# Patient Record
Sex: FEMALE | Race: WHITE | NOT HISPANIC OR LATINO | Employment: FULL TIME
[De-identification: names, ages, dates, MRNs, and addresses within clinical notes are randomized per-mention and may not be internally consistent; named-entity substitution may affect disease eponyms.]

---

## 2017-04-26 ENCOUNTER — RECORDS - HEALTHEAST (OUTPATIENT)
Dept: ADMINISTRATIVE | Facility: OTHER | Age: 62
End: 2017-04-26

## 2017-05-25 ENCOUNTER — COMMUNICATION - HEALTHEAST (OUTPATIENT)
Dept: FAMILY MEDICINE | Facility: CLINIC | Age: 62
End: 2017-05-25

## 2017-05-25 DIAGNOSIS — K21.9 ESOPHAGEAL REFLUX: ICD-10-CM

## 2017-08-04 ENCOUNTER — OFFICE VISIT - HEALTHEAST (OUTPATIENT)
Dept: FAMILY MEDICINE | Facility: CLINIC | Age: 62
End: 2017-08-04

## 2017-08-04 DIAGNOSIS — K21.9 ESOPHAGEAL REFLUX: ICD-10-CM

## 2017-08-04 DIAGNOSIS — M79.671 PAIN OF RIGHT HEEL: ICD-10-CM

## 2017-08-04 DIAGNOSIS — K44.9 HIATAL HERNIA: ICD-10-CM

## 2017-09-20 ENCOUNTER — AMBULATORY - HEALTHEAST (OUTPATIENT)
Dept: ONCOLOGY | Facility: HOSPITAL | Age: 62
End: 2017-09-20

## 2017-09-20 DIAGNOSIS — C50.412 MALIGNANT NEOPLASM OF UPPER-OUTER QUADRANT OF LEFT FEMALE BREAST (H): ICD-10-CM

## 2017-10-06 ENCOUNTER — RECORDS - HEALTHEAST (OUTPATIENT)
Dept: ADMINISTRATIVE | Facility: OTHER | Age: 62
End: 2017-10-06

## 2017-10-06 ENCOUNTER — RECORDS - HEALTHEAST (OUTPATIENT)
Dept: BONE DENSITY | Facility: CLINIC | Age: 62
End: 2017-10-06

## 2017-10-06 ENCOUNTER — HOSPITAL ENCOUNTER (OUTPATIENT)
Dept: MAMMOGRAPHY | Facility: CLINIC | Age: 62
Discharge: HOME | End: 2017-10-06
Attending: INTERNAL MEDICINE

## 2017-10-06 DIAGNOSIS — C50.412 MALIGNANT NEOPLASM OF UPPER-OUTER QUADRANT OF LEFT FEMALE BREAST (H): ICD-10-CM

## 2017-10-06 DIAGNOSIS — Z12.31 VISIT FOR SCREENING MAMMOGRAM: ICD-10-CM

## 2017-10-16 ENCOUNTER — OFFICE VISIT - HEALTHEAST (OUTPATIENT)
Dept: ONCOLOGY | Facility: CLINIC | Age: 62
End: 2017-10-16

## 2017-10-16 ENCOUNTER — AMBULATORY - HEALTHEAST (OUTPATIENT)
Dept: INFUSION THERAPY | Facility: CLINIC | Age: 62
End: 2017-10-16

## 2017-10-16 DIAGNOSIS — C50.919 MALIGNANT NEOPLASM OF FEMALE BREAST (H): ICD-10-CM

## 2017-10-16 DIAGNOSIS — C50.412 MALIGNANT NEOPLASM OF UPPER-OUTER QUADRANT OF LEFT FEMALE BREAST (H): ICD-10-CM

## 2018-02-23 ENCOUNTER — RECORDS - HEALTHEAST (OUTPATIENT)
Dept: ADMINISTRATIVE | Facility: OTHER | Age: 63
End: 2018-02-23

## 2018-09-24 ENCOUNTER — OFFICE VISIT - HEALTHEAST (OUTPATIENT)
Dept: FAMILY MEDICINE | Facility: CLINIC | Age: 63
End: 2018-09-24

## 2018-09-24 DIAGNOSIS — R23.2 HOT FLASHES: ICD-10-CM

## 2018-09-24 DIAGNOSIS — K21.9 ESOPHAGEAL REFLUX: ICD-10-CM

## 2018-09-24 DIAGNOSIS — Z12.31 VISIT FOR SCREENING MAMMOGRAM: ICD-10-CM

## 2018-09-24 DIAGNOSIS — Z85.3 HISTORY OF LEFT BREAST CANCER: ICD-10-CM

## 2018-09-24 DIAGNOSIS — Z00.00 HEALTH CARE MAINTENANCE: ICD-10-CM

## 2018-09-24 DIAGNOSIS — M85.80 OSTEOPENIA: ICD-10-CM

## 2018-09-24 LAB
ALBUMIN SERPL-MCNC: 4 G/DL (ref 3.5–5)
ALP SERPL-CCNC: 66 U/L (ref 45–120)
ALT SERPL W P-5'-P-CCNC: 25 U/L (ref 0–45)
ANION GAP SERPL CALCULATED.3IONS-SCNC: 9 MMOL/L (ref 5–18)
AST SERPL W P-5'-P-CCNC: 38 U/L (ref 0–40)
BILIRUB SERPL-MCNC: 0.5 MG/DL (ref 0–1)
BUN SERPL-MCNC: 14 MG/DL (ref 8–22)
CALCIUM SERPL-MCNC: 9.5 MG/DL (ref 8.5–10.5)
CHLORIDE BLD-SCNC: 109 MMOL/L (ref 98–107)
CHOLEST SERPL-MCNC: 206 MG/DL
CO2 SERPL-SCNC: 22 MMOL/L (ref 22–31)
CREAT SERPL-MCNC: 0.74 MG/DL (ref 0.6–1.1)
FASTING STATUS PATIENT QL REPORTED: YES
GFR SERPL CREATININE-BSD FRML MDRD: >60 ML/MIN/1.73M2
GLUCOSE BLD-MCNC: 84 MG/DL (ref 70–125)
HDLC SERPL-MCNC: 57 MG/DL
HGB BLD-MCNC: 13.7 G/DL (ref 12–16)
LDLC SERPL CALC-MCNC: 133 MG/DL
POTASSIUM BLD-SCNC: 4.4 MMOL/L (ref 3.5–5)
PROT SERPL-MCNC: 6.6 G/DL (ref 6–8)
SODIUM SERPL-SCNC: 140 MMOL/L (ref 136–145)
TRIGL SERPL-MCNC: 79 MG/DL
TSH SERPL DL<=0.005 MIU/L-ACNC: 1.11 UIU/ML (ref 0.3–5)

## 2018-09-24 ASSESSMENT — MIFFLIN-ST. JEOR: SCORE: 1224.86

## 2018-09-25 LAB
25(OH)D3 SERPL-MCNC: 37.5 NG/ML (ref 30–80)
25(OH)D3 SERPL-MCNC: 37.5 NG/ML (ref 30–80)

## 2018-10-08 ENCOUNTER — HOSPITAL ENCOUNTER (OUTPATIENT)
Dept: MAMMOGRAPHY | Facility: CLINIC | Age: 63
Discharge: HOME | End: 2018-10-08
Attending: FAMILY MEDICINE

## 2018-10-08 DIAGNOSIS — Z12.31 VISIT FOR SCREENING MAMMOGRAM: ICD-10-CM

## 2019-02-04 ENCOUNTER — COMMUNICATION - HEALTHEAST (OUTPATIENT)
Dept: ONCOLOGY | Facility: CLINIC | Age: 64
End: 2019-02-04

## 2019-02-18 ENCOUNTER — COMMUNICATION - HEALTHEAST (OUTPATIENT)
Dept: ONCOLOGY | Facility: HOSPITAL | Age: 64
End: 2019-02-18

## 2019-05-27 ENCOUNTER — COMMUNICATION - HEALTHEAST (OUTPATIENT)
Dept: SCHEDULING | Facility: CLINIC | Age: 64
End: 2019-05-27

## 2019-05-28 ENCOUNTER — OFFICE VISIT - HEALTHEAST (OUTPATIENT)
Dept: FAMILY MEDICINE | Facility: CLINIC | Age: 64
End: 2019-05-28

## 2019-05-28 DIAGNOSIS — R19.7 DIARRHEA, UNSPECIFIED TYPE: ICD-10-CM

## 2019-05-28 DIAGNOSIS — R11.0 NAUSEA: ICD-10-CM

## 2019-05-28 DIAGNOSIS — R63.0 LACK OF APPETITE: ICD-10-CM

## 2019-05-28 LAB
ERYTHROCYTE [DISTWIDTH] IN BLOOD BY AUTOMATED COUNT: 11.2 % (ref 11–14.5)
HCT VFR BLD AUTO: 41.4 % (ref 35–47)
HGB BLD-MCNC: 13.5 G/DL (ref 12–16)
MCH RBC QN AUTO: 29.3 PG (ref 27–34)
MCHC RBC AUTO-ENTMCNC: 32.6 G/DL (ref 32–36)
MCV RBC AUTO: 90 FL (ref 80–100)
PLATELET # BLD AUTO: 197 THOU/UL (ref 140–440)
PMV BLD AUTO: 8.8 FL (ref 7–10)
RBC # BLD AUTO: 4.62 MILL/UL (ref 3.8–5.4)
WBC: 8.9 THOU/UL (ref 4–11)

## 2019-05-28 ASSESSMENT — MIFFLIN-ST. JEOR: SCORE: 1222.19

## 2019-05-29 ENCOUNTER — COMMUNICATION - HEALTHEAST (OUTPATIENT)
Dept: FAMILY MEDICINE | Facility: CLINIC | Age: 64
End: 2019-05-29

## 2019-05-29 ENCOUNTER — AMBULATORY - HEALTHEAST (OUTPATIENT)
Dept: FAMILY MEDICINE | Facility: CLINIC | Age: 64
End: 2019-05-29

## 2019-05-29 ENCOUNTER — AMBULATORY - HEALTHEAST (OUTPATIENT)
Dept: LAB | Facility: CLINIC | Age: 64
End: 2019-05-29

## 2019-05-29 DIAGNOSIS — R19.7 DIARRHEA, UNSPECIFIED TYPE: ICD-10-CM

## 2019-05-29 DIAGNOSIS — A49.8 CLOSTRIDIUM DIFFICILE INFECTION: ICD-10-CM

## 2019-05-29 LAB
C DIFF TOX B STL QL: POSITIVE
RIBOTYPE 027/NAP1/BI: ABNORMAL

## 2019-05-30 ENCOUNTER — COMMUNICATION - HEALTHEAST (OUTPATIENT)
Dept: FAMILY MEDICINE | Facility: CLINIC | Age: 64
End: 2019-05-30

## 2019-07-08 ENCOUNTER — COMMUNICATION - HEALTHEAST (OUTPATIENT)
Dept: SCHEDULING | Facility: CLINIC | Age: 64
End: 2019-07-08

## 2019-07-08 ENCOUNTER — COMMUNICATION - HEALTHEAST (OUTPATIENT)
Dept: ONCOLOGY | Facility: CLINIC | Age: 64
End: 2019-07-08

## 2019-07-10 ENCOUNTER — OFFICE VISIT - HEALTHEAST (OUTPATIENT)
Dept: FAMILY MEDICINE | Facility: CLINIC | Age: 64
End: 2019-07-10

## 2019-07-10 DIAGNOSIS — N63.20 LEFT BREAST LUMP: ICD-10-CM

## 2019-07-10 ASSESSMENT — MIFFLIN-ST. JEOR: SCORE: 1238.46

## 2019-07-11 ENCOUNTER — HOSPITAL ENCOUNTER (OUTPATIENT)
Dept: ULTRASOUND IMAGING | Facility: CLINIC | Age: 64
Discharge: HOME | End: 2019-07-11
Attending: FAMILY MEDICINE

## 2019-07-11 DIAGNOSIS — N63.20 LEFT BREAST LUMP: ICD-10-CM

## 2019-10-11 ENCOUNTER — OFFICE VISIT - HEALTHEAST (OUTPATIENT)
Dept: FAMILY MEDICINE | Facility: CLINIC | Age: 64
End: 2019-10-11

## 2019-10-11 DIAGNOSIS — R19.5 DARK STOOLS: ICD-10-CM

## 2019-10-11 DIAGNOSIS — Z12.31 ENCOUNTER FOR SCREENING MAMMOGRAM FOR BREAST CANCER: ICD-10-CM

## 2019-10-11 DIAGNOSIS — E66.09 CLASS 1 OBESITY DUE TO EXCESS CALORIES WITHOUT SERIOUS COMORBIDITY WITH BODY MASS INDEX (BMI) OF 30.0 TO 30.9 IN ADULT: ICD-10-CM

## 2019-10-11 DIAGNOSIS — M85.80 OSTEOPENIA, UNSPECIFIED LOCATION: ICD-10-CM

## 2019-10-11 DIAGNOSIS — Z85.3 PERSONAL HISTORY OF MALIGNANT NEOPLASM OF BREAST: ICD-10-CM

## 2019-10-11 DIAGNOSIS — Z11.59 NEED FOR HEPATITIS C SCREENING TEST: ICD-10-CM

## 2019-10-11 DIAGNOSIS — E66.811 CLASS 1 OBESITY DUE TO EXCESS CALORIES WITHOUT SERIOUS COMORBIDITY WITH BODY MASS INDEX (BMI) OF 30.0 TO 30.9 IN ADULT: ICD-10-CM

## 2019-10-11 DIAGNOSIS — Z11.4 SCREENING FOR HUMAN IMMUNODEFICIENCY VIRUS: ICD-10-CM

## 2019-10-11 DIAGNOSIS — Z00.00 PHYSICAL EXAM: ICD-10-CM

## 2019-10-11 DIAGNOSIS — Z23 NEED FOR IMMUNIZATION AGAINST INFLUENZA: ICD-10-CM

## 2019-10-11 DIAGNOSIS — K21.9 GASTROESOPHAGEAL REFLUX DISEASE WITHOUT ESOPHAGITIS: ICD-10-CM

## 2019-10-11 LAB
ALBUMIN SERPL-MCNC: 4 G/DL (ref 3.5–5)
ALP SERPL-CCNC: 75 U/L (ref 45–120)
ALT SERPL W P-5'-P-CCNC: 22 U/L (ref 0–45)
ANION GAP SERPL CALCULATED.3IONS-SCNC: 10 MMOL/L (ref 5–18)
AST SERPL W P-5'-P-CCNC: 34 U/L (ref 0–40)
BILIRUB SERPL-MCNC: 0.6 MG/DL (ref 0–1)
BUN SERPL-MCNC: 13 MG/DL (ref 8–22)
CALCIUM SERPL-MCNC: 9.6 MG/DL (ref 8.5–10.5)
CHLORIDE BLD-SCNC: 105 MMOL/L (ref 98–107)
CHOLEST SERPL-MCNC: 218 MG/DL
CO2 SERPL-SCNC: 22 MMOL/L (ref 22–31)
CREAT SERPL-MCNC: 0.78 MG/DL (ref 0.6–1.1)
ERYTHROCYTE [DISTWIDTH] IN BLOOD BY AUTOMATED COUNT: 12.6 % (ref 11–14.5)
FASTING STATUS PATIENT QL REPORTED: YES
FERRITIN SERPL-MCNC: 12 NG/ML (ref 10–130)
GFR SERPL CREATININE-BSD FRML MDRD: >60 ML/MIN/1.73M2
GLUCOSE BLD-MCNC: 87 MG/DL (ref 70–125)
HBA1C MFR BLD: 5.4 % (ref 3.5–6)
HCT VFR BLD AUTO: 40.7 % (ref 35–47)
HCV AB SERPL QL IA: NEGATIVE
HDLC SERPL-MCNC: 61 MG/DL
HGB BLD-MCNC: 13.7 G/DL (ref 12–16)
HIV 1+2 AB+HIV1 P24 AG SERPL QL IA: NEGATIVE
IRON SATN MFR SERPL: 27 % (ref 20–50)
IRON SERPL-MCNC: 110 UG/DL (ref 42–175)
LDLC SERPL CALC-MCNC: 134 MG/DL
MCH RBC QN AUTO: 28.6 PG (ref 27–34)
MCHC RBC AUTO-ENTMCNC: 33.7 G/DL (ref 32–36)
MCV RBC AUTO: 85 FL (ref 80–100)
PLATELET # BLD AUTO: 193 THOU/UL (ref 140–440)
PMV BLD AUTO: 9.1 FL (ref 7–10)
POTASSIUM BLD-SCNC: 4.4 MMOL/L (ref 3.5–5)
PROT SERPL-MCNC: 6.6 G/DL (ref 6–8)
RBC # BLD AUTO: 4.8 MILL/UL (ref 3.8–5.4)
SODIUM SERPL-SCNC: 137 MMOL/L (ref 136–145)
TIBC SERPL-MCNC: 412 UG/DL (ref 313–563)
TRANSFERRIN SERPL-MCNC: 329 MG/DL (ref 212–360)
TRIGL SERPL-MCNC: 113 MG/DL
TSH SERPL DL<=0.005 MIU/L-ACNC: 1.65 UIU/ML (ref 0.3–5)
WBC: 6.2 THOU/UL (ref 4–11)

## 2019-10-11 RX ORDER — FAMOTIDINE 20 MG/1
20 TABLET, FILM COATED ORAL 2 TIMES DAILY
Status: SHIPPED | COMMUNITY
Start: 2019-10-11 | End: 2021-09-01

## 2019-10-11 ASSESSMENT — MIFFLIN-ST. JEOR: SCORE: 1265.4

## 2019-10-14 LAB
HPV SOURCE: NORMAL
HUMAN PAPILLOMA VIRUS 16 DNA: NEGATIVE
HUMAN PAPILLOMA VIRUS 18 DNA: NEGATIVE
HUMAN PAPILLOMA VIRUS FINAL DIAGNOSIS: NORMAL
HUMAN PAPILLOMA VIRUS OTHER HR: NEGATIVE
SPECIMEN DESCRIPTION: NORMAL

## 2019-10-15 ENCOUNTER — AMBULATORY - HEALTHEAST (OUTPATIENT)
Dept: LAB | Facility: CLINIC | Age: 64
End: 2019-10-15

## 2019-10-15 DIAGNOSIS — Z00.00 ROUTINE GENERAL MEDICAL EXAMINATION AT A HEALTH CARE FACILITY: ICD-10-CM

## 2019-10-15 LAB
HEMOCCULT SP1 STL QL: NEGATIVE
HEMOCCULT SP2 STL QL: NEGATIVE
HEMOCCULT SP3 STL QL: NEGATIVE

## 2019-10-18 LAB
BKR LAB AP ABNORMAL BLEEDING: NO
BKR LAB AP BIRTH CONTROL/HORMONES: NORMAL
BKR LAB AP CERVICAL APPEARANCE: NORMAL
BKR LAB AP GYN ADEQUACY: NORMAL
BKR LAB AP GYN INTERPRETATION: NORMAL
BKR LAB AP HPV REFLEX: NORMAL
BKR LAB AP LMP: 2010
BKR LAB AP PATIENT STATUS: NORMAL
BKR LAB AP PREVIOUS ABNORMAL: NORMAL
BKR LAB AP PREVIOUS NORMAL: 2014
HIGH RISK?: NO
PATH REPORT.COMMENTS IMP SPEC: NORMAL
RESULT FLAG (HE HISTORICAL CONVERSION): NORMAL

## 2019-12-02 ENCOUNTER — RECORDS - HEALTHEAST (OUTPATIENT)
Dept: ADMINISTRATIVE | Facility: OTHER | Age: 64
End: 2019-12-02

## 2019-12-02 ENCOUNTER — HOSPITAL ENCOUNTER (OUTPATIENT)
Dept: MAMMOGRAPHY | Facility: CLINIC | Age: 64
Discharge: HOME | End: 2019-12-02
Attending: FAMILY MEDICINE

## 2019-12-02 ENCOUNTER — RECORDS - HEALTHEAST (OUTPATIENT)
Dept: BONE DENSITY | Facility: CLINIC | Age: 64
End: 2019-12-02

## 2019-12-02 DIAGNOSIS — M85.80 OTHER SPECIFIED DISORDERS OF BONE DENSITY AND STRUCTURE, UNSPECIFIED SITE: ICD-10-CM

## 2019-12-02 DIAGNOSIS — Z12.31 ENCOUNTER FOR SCREENING MAMMOGRAM FOR BREAST CANCER: ICD-10-CM

## 2019-12-16 ENCOUNTER — COMMUNICATION - HEALTHEAST (OUTPATIENT)
Dept: FAMILY MEDICINE | Facility: CLINIC | Age: 64
End: 2019-12-16

## 2020-01-02 ENCOUNTER — COMMUNICATION - HEALTHEAST (OUTPATIENT)
Dept: SCHEDULING | Facility: CLINIC | Age: 65
End: 2020-01-02

## 2020-01-08 ENCOUNTER — OFFICE VISIT - HEALTHEAST (OUTPATIENT)
Dept: FAMILY MEDICINE | Facility: CLINIC | Age: 65
End: 2020-01-08

## 2020-01-08 DIAGNOSIS — K44.9 HIATAL HERNIA: ICD-10-CM

## 2020-01-08 DIAGNOSIS — R06.02 SHORTNESS OF BREATH: ICD-10-CM

## 2020-01-08 DIAGNOSIS — K21.9 GASTROESOPHAGEAL REFLUX DISEASE WITHOUT ESOPHAGITIS: ICD-10-CM

## 2020-01-09 ENCOUNTER — RECORDS - HEALTHEAST (OUTPATIENT)
Dept: GENERAL RADIOLOGY | Facility: CLINIC | Age: 65
End: 2020-01-09

## 2020-01-09 DIAGNOSIS — R06.02 SHORTNESS OF BREATH: ICD-10-CM

## 2020-01-10 ENCOUNTER — COMMUNICATION - HEALTHEAST (OUTPATIENT)
Dept: FAMILY MEDICINE | Facility: CLINIC | Age: 65
End: 2020-01-10

## 2020-02-14 ENCOUNTER — RECORDS - HEALTHEAST (OUTPATIENT)
Dept: ADMINISTRATIVE | Facility: OTHER | Age: 65
End: 2020-02-14

## 2020-03-13 ENCOUNTER — RECORDS - HEALTHEAST (OUTPATIENT)
Dept: ADMINISTRATIVE | Facility: OTHER | Age: 65
End: 2020-03-13

## 2020-06-22 ENCOUNTER — RECORDS - HEALTHEAST (OUTPATIENT)
Dept: ADMINISTRATIVE | Facility: OTHER | Age: 65
End: 2020-06-22

## 2020-12-31 ENCOUNTER — HOSPITAL ENCOUNTER (OUTPATIENT)
Dept: MAMMOGRAPHY | Facility: CLINIC | Age: 65
Discharge: HOME | End: 2020-12-31
Attending: FAMILY MEDICINE

## 2020-12-31 DIAGNOSIS — Z12.31 VISIT FOR SCREENING MAMMOGRAM: ICD-10-CM

## 2021-03-01 ENCOUNTER — OFFICE VISIT - HEALTHEAST (OUTPATIENT)
Dept: FAMILY MEDICINE | Facility: CLINIC | Age: 66
End: 2021-03-01

## 2021-03-01 DIAGNOSIS — E66.3 OVERWEIGHT (BMI 25.0-29.9): ICD-10-CM

## 2021-03-01 DIAGNOSIS — E78.2 MIXED HYPERLIPIDEMIA: ICD-10-CM

## 2021-03-01 DIAGNOSIS — H25.9 AGE-RELATED CATARACT OF BOTH EYES, UNSPECIFIED AGE-RELATED CATARACT TYPE: ICD-10-CM

## 2021-03-01 DIAGNOSIS — R79.0 LOW IRON STORES: ICD-10-CM

## 2021-03-01 DIAGNOSIS — Z01.818 PRE-OPERATIVE EXAMINATION: ICD-10-CM

## 2021-03-01 LAB
CHOLEST SERPL-MCNC: 215 MG/DL
FASTING STATUS PATIENT QL REPORTED: YES
FASTING STATUS PATIENT QL REPORTED: YES
FERRITIN SERPL-MCNC: 4 NG/ML (ref 10–130)
GLUCOSE BLD-MCNC: 99 MG/DL (ref 70–125)
HDLC SERPL-MCNC: 59 MG/DL
HGB BLD-MCNC: 12.3 G/DL (ref 12–16)
IRON SATN MFR SERPL: 8 % (ref 20–50)
IRON SERPL-MCNC: 37 UG/DL (ref 42–175)
LDLC SERPL CALC-MCNC: 138 MG/DL
TIBC SERPL-MCNC: 446 UG/DL (ref 313–563)
TRANSFERRIN SERPL-MCNC: 357 MG/DL (ref 212–360)
TRIGL SERPL-MCNC: 91 MG/DL

## 2021-03-01 ASSESSMENT — MIFFLIN-ST. JEOR: SCORE: 1252.07

## 2021-04-15 ENCOUNTER — AMBULATORY - HEALTHEAST (OUTPATIENT)
Dept: NURSING | Facility: CLINIC | Age: 66
End: 2021-04-15

## 2021-05-06 ENCOUNTER — AMBULATORY - HEALTHEAST (OUTPATIENT)
Dept: NURSING | Facility: CLINIC | Age: 66
End: 2021-05-06

## 2021-05-10 ENCOUNTER — RECORDS - HEALTHEAST (OUTPATIENT)
Dept: ADMINISTRATIVE | Facility: OTHER | Age: 66
End: 2021-05-10

## 2021-05-26 ENCOUNTER — RECORDS - HEALTHEAST (OUTPATIENT)
Dept: ADMINISTRATIVE | Facility: CLINIC | Age: 66
End: 2021-05-26

## 2021-05-27 ENCOUNTER — RECORDS - HEALTHEAST (OUTPATIENT)
Dept: ADMINISTRATIVE | Facility: CLINIC | Age: 66
End: 2021-05-27

## 2021-05-28 ENCOUNTER — RECORDS - HEALTHEAST (OUTPATIENT)
Dept: ADMINISTRATIVE | Facility: CLINIC | Age: 66
End: 2021-05-28

## 2021-05-29 NOTE — TELEPHONE ENCOUNTER
"  Call from pt       CC:  Peridic diarrhea since Fri am - Recent ABX use         > Recently been on Clindamycin and APAP and IBU for a dental extraction       > No fever   > Some ABD spasming yesterday - better today   > Joint pain and fatigue  > Did have single episode of \"black stool\" had recently taken pepto though   > Also took some Imodium     Appetite not great today     A/P:   > Fluids to hydrate     > BRAT diet  Discussed as well    > Good handwashing hygene     > Appt for tomorrow      Shane Shen RN   Triage and Medication Refills        Reason for Disposition    [1] MODERATE diarrhea (e.g., 4-6 times / day more than normal) AND [2] present > 48 hours (2 days)    Protocols used: DIARRHEA-A-AH      "

## 2021-05-29 NOTE — TELEPHONE ENCOUNTER
----- Message from Angela Basilio CNP sent at 5/28/2019  8:57 PM CDT -----  Please notify the patient that her lab results are normal.

## 2021-05-29 NOTE — TELEPHONE ENCOUNTER
Reason contacted:  Results   Information relayed:  Below message.  1. When can she return to work?  2. When can she resume a normal diet?   3. She usually struggles with constipation and is wondering if she develops constipation if she can use miralax?   Additional questions:  Yes  Further follow-up needed:  Yes  Okay to leave a detailed message:  No

## 2021-05-29 NOTE — TELEPHONE ENCOUNTER
----- Message from Angela Basilio CNP sent at 5/29/2019  7:07 PM CDT -----  Please notify the patient that she tested positive for a c-diff infection as we suspected.  I sent a prescription to the pharmacy for Vancomycin 125 mg four times daily for 10 days.  If her symptoms persist after the antibiotic, I recommend that she follow-up with Dr. Solis.  Thanks.

## 2021-05-29 NOTE — TELEPHONE ENCOUNTER
Reason contacted:  results  Information relayed:  See message  Additional questions:  No  Further follow-up needed:  No  Okay to leave a detailed message:  No

## 2021-05-29 NOTE — PROGRESS NOTES
Assessment and Plan:     1. Diarrhea, unspecified type  C. difficile Toxigenic by PCR    HM2(CBC w/o Differential)   2. Nausea     3. Lack of appetite       Patient recently took Clindamycin for a dental infection.  I am suspicious for C. difficile.  Will obtain stool culture.  Black stool is likely due to Pepto-Bismol use, but will check hemogram for evaluation of anemia.  Other differentials include ova and parasites, Giardia, viral infection.  Discussed symptomatic treatment and the importance of good hydration.  If labs are normal and diarrhea persists, will obtain more stool cultures and refer to gastroenterology.  She is content with the plan.    Subjective:     Samir is a 64 y.o. female presenting to the clinic for concerns for diarrhea.  Patient woke up with diarrhea on Friday.  She took Pepto-Bismol 3 times and Imodium 4 times.  Her diarrhea slightly improved.  Patient has continued to experience loose bowel movements 1-2 times every hour.  Patient noticed black stools on Sunday.  Last night, her diarrhea was watery.  She has felt some abdominal spasms which improve with having a bowel movement.  She complains of lack of appetite and nausea.  No fever has been present.  She denies any recent travel.  No one else around her has had diarrhea.  Patient states she had an infected pulled tooth last week and took clindamycin 4 times daily.  Her last dose was last Tuesday.    Review of Systems: A complete 14 point review of systems was obtained and is negative or as stated in the history of present illness.    Social History     Socioeconomic History     Marital status: Single     Spouse name: Not on file     Number of children: Not on file     Years of education: Not on file     Highest education level: Not on file   Occupational History     Not on file   Social Needs     Financial resource strain: Not on file     Food insecurity:     Worry: Not on file     Inability: Not on file     Transportation needs:     " Medical: Not on file     Non-medical: Not on file   Tobacco Use     Smoking status: Former Smoker     Packs/day: 0.50     Last attempt to quit: 12/10/2010     Years since quittin.4     Smokeless tobacco: Never Used     Tobacco comment: quit dec 2010   Substance and Sexual Activity     Alcohol use: No     Drug use: No     Sexual activity: Never   Lifestyle     Physical activity:     Days per week: Not on file     Minutes per session: Not on file     Stress: Not on file   Relationships     Social connections:     Talks on phone: Not on file     Gets together: Not on file     Attends Mandaeism service: Not on file     Active member of club or organization: Not on file     Attends meetings of clubs or organizations: Not on file     Relationship status: Not on file     Intimate partner violence:     Fear of current or ex partner: Not on file     Emotionally abused: Not on file     Physically abused: Not on file     Forced sexual activity: Not on file   Other Topics Concern     Not on file   Social History Narrative     Not on file       Active Ambulatory Problems     Diagnosis Date Noted     Generalized Anxiety Disorder      Acute Post-traumatic Stress Disorder      Esophageal Reflux      Osteopenia      Malignant neoplasm of breast (female), unspecified site 10/10/2014     Elevated LFTs 10/07/2015     Hiatal hernia 2017     Resolved Ambulatory Problems     Diagnosis Date Noted     No Resolved Ambulatory Problems     Past Medical History:   Diagnosis Date     Breast cancer (H) 2011       Family History   Problem Relation Age of Onset     Stroke Mother      Cancer Father      Lung cancer Father      Diabetes Brother      Hypertension Brother      Diabetes Brother      Hypertension Brother      Heart disease Brother         atrial flutter     Arthritis Brother      Breast cancer Maternal Aunt      Breast cancer Cousin        Objective:     BP 98/60   Pulse 74   Ht 5' 2\" (1.575 m)   Wt 159 lb 9.6 oz " (72.4 kg)   SpO2 97%   BMI 29.19 kg/m      Patient is alert, in no obvious distress.   Skin: Warm, dry.    Lungs:  Clear to auscultation. Respirations even and unlabored.  No wheezing or rales noted.   Heart:  Regular rate and rhythm.  No murmurs.   Abdomen: Soft, nontender.  No organomegaly. Bowel sounds hyperactive. No guarding or masses noted.

## 2021-05-29 NOTE — TELEPHONE ENCOUNTER
Called and spoke with patient.  Discussed that I would recommend she take the treatment for C. difficile as there is a high risk of recurrence if she is not completely treated.  Discussed that cheaper option would be metronidazole but this may potentially have more side effects.  She elected to go with the vancomycin.  She will contact us if any additional questions or concerns

## 2021-05-29 NOTE — TELEPHONE ENCOUNTER
Medication Question or Clarification  Who is calling: Patient  What medication are you calling about? (include dose and sig)   vancomycin (VANCOCIN) 125 MG capsule 40 capsule 0 5/29/2019 6/8/2019    Sig - Route: Take 1 capsule (125 mg total) by mouth 4 (four) times a day for 10 days. - Oral    Sent to pharmacy as: vancomycin (VANCOCIN) 125 MG capsule        Who prescribed the medication?: Ruthie Solis MD   What is your question/concern?: Patient stated The pharmacist stressed concerned about this antibiotic being too strong and due to not having diarrhea currently.  Patient also mentioned this medication is over $200 and she would like an alternative that the pharmacy recommended (did not remember name). Patient is asking to talk directly to Ruthie Solis MD about this issue.   Pharmacy: Malaika Byrd to leave a detailed message?: Yes  Site CMT - Please call the pharmacy to obtain any additional needed information.

## 2021-05-30 NOTE — PROGRESS NOTES
Assessment/Plan:     1. Left breast lump  US Breast Limited (Focal) Left       Diagnoses and all orders for this visit:    Left breast lump  -     US Breast Limited (Focal) Left; Future; Expected date: 07/10/2019    We will will obtain ultrasound of left breast for further evaluation.  Further recommendations will be made once these results are available.         Subjective:      Samir Tomlin is a 64 y.o. female who comes in today with concern about a lump in the left breast.  She has a history of left breast cancer and underwent left mastectomy.  She has an implant now in the left breast.  She completed treatment for breast cancer in 2017.  She last had a mammogram of the right breast in October 2018.  Has not had imaging of the left breast due to previous mastectomy.  About 4 days ago she noticed a lump in the upper part of the left breast while adjusting her bra.  Started feeling around and noticed that the tissue felt different and she felt a lump.  It has not seemed to change in size over the last few days.  It is a little bit sore if she pushes on it a bit.  She has not noticed any redness or warmth of the skin.  She has otherwise been feeling well.  She has no other concerns or questions today.  Reviewed meds and allergies and updated the chart    Current Outpatient Medications   Medication Sig Dispense Refill     CALCIUM CARBONATE-VITAMIN D3 ORAL Take 1 tablet by mouth 2 (two) times a day. 500mg + 400iu vitamin d       MULTIVITAMIN ORAL Take by mouth.       POLYETHYLENE GLYCOL 3350 ORAL Take 17 g by mouth daily.        ranitidine (ZANTAC) 150 MG tablet Take 150 mg by mouth daily.       vitamin E 400 UNIT capsule Take 1,000 Units by mouth daily.        No current facility-administered medications for this visit.        Past Medical History, Family History, and Social History reviewed.  Past Medical History:   Diagnosis Date     Breast cancer (H) feb 14 2011    left breast     Past Surgical  History:   Procedure Laterality Date     BREAST BIOPSY Left      MASTECTOMY Left 2011    implant left breast     REDUCTION MAMMAPLASTY Right 2011     Erythromycin base  Family History   Problem Relation Age of Onset     Stroke Mother      Cancer Father      Lung cancer Father      Diabetes Brother      Hypertension Brother      Diabetes Brother      Hypertension Brother      Heart disease Brother         atrial flutter     Arthritis Brother      Breast cancer Maternal Aunt      Breast cancer Cousin      Social History     Socioeconomic History     Marital status: Single     Spouse name: Not on file     Number of children: Not on file     Years of education: Not on file     Highest education level: Not on file   Occupational History     Not on file   Social Needs     Financial resource strain: Not on file     Food insecurity:     Worry: Not on file     Inability: Not on file     Transportation needs:     Medical: Not on file     Non-medical: Not on file   Tobacco Use     Smoking status: Former Smoker     Packs/day: 0.50     Last attempt to quit: 12/10/2010     Years since quittin.5     Smokeless tobacco: Never Used     Tobacco comment: quit dec 2010   Substance and Sexual Activity     Alcohol use: No     Drug use: No     Sexual activity: Never   Lifestyle     Physical activity:     Days per week: Not on file     Minutes per session: Not on file     Stress: Not on file   Relationships     Social connections:     Talks on phone: Not on file     Gets together: Not on file     Attends Yazdanism service: Not on file     Active member of club or organization: Not on file     Attends meetings of clubs or organizations: Not on file     Relationship status: Not on file     Intimate partner violence:     Fear of current or ex partner: Not on file     Emotionally abused: Not on file     Physically abused: Not on file     Forced sexual activity: Not on file   Other Topics Concern     Not on file   Social History Narrative  "    Not on file         Review of systems is as stated in HPI, and the remainder of the 10 system review is otherwise negative.    Objective:     Vitals:    07/10/19 1338   BP: 110/70   Patient Site: Right Arm   Patient Position: Sitting   Cuff Size: Adult Large   Pulse: 78   Temp: 98.6  F (37  C)   TempSrc: Oral   SpO2: 98%   Weight: 164 lb 1 oz (74.4 kg)   Height: 5' 1.75\" (1.568 m)    Body mass index is 30.25 kg/m .    General appearance: alert, appears stated age and cooperative  Head: Normocephalic, without obvious abnormality, atraumatic  Breasts: s/p left mastectomy, left breast implant present, in RUQ of left breast is palpable soft tissue lump that is mobile and non-tender, no erthema or warmth of overlying skin        This note has been dictated using voice recognition software. Any grammatical or context distortions are unintentional and inherent to the the software.       "

## 2021-05-30 NOTE — TELEPHONE ENCOUNTER
"Samir calls today to report that on Saturday she found a \"lump\" on her left breast. It is about 1\" in diameter and is \"sort of soft, sort of hard, and doesn't move around.\" She thinks it could be a muscle, but is unsure how to accurately describe it. She does have a breast implant in her left breast for hx of left mastectomy. I advised that since she has not seen Dr. Truong since 2017, and her PCP is primarily management her ongoing surveillance of the breast cancer, to schedule with Dr. Solis, and should she see a need for Oncology to be involved, she may contact us. She is comfortable with that plan for now. Shannan Lazo    "

## 2021-05-30 NOTE — TELEPHONE ENCOUNTER
"Pt noticed \"small hard lump on L breast\".  \"Same side where I had breast implant after mastectomy.\"  Lump is oblong-shaped, approx half inch.  No signs of infection.  No pain.    Pt agrees to clinical eval per triage disposition.  Warm transferred to a  for this purpose now.    Jing Bob RN BSBA  [facility] RN Triage     Reason for Disposition    Breast lump    Protocols used: BREAST SYMPTOMS-A-OH      "

## 2021-05-31 ENCOUNTER — RECORDS - HEALTHEAST (OUTPATIENT)
Dept: ADMINISTRATIVE | Facility: CLINIC | Age: 66
End: 2021-05-31

## 2021-05-31 VITALS — BODY MASS INDEX: 28.89 KG/M2 | WEIGHT: 160.5 LBS

## 2021-05-31 VITALS — WEIGHT: 154.5 LBS | BODY MASS INDEX: 27.81 KG/M2

## 2021-06-02 ENCOUNTER — RECORDS - HEALTHEAST (OUTPATIENT)
Dept: ADMINISTRATIVE | Facility: CLINIC | Age: 66
End: 2021-06-02

## 2021-06-02 VITALS — HEIGHT: 62 IN | BODY MASS INDEX: 29.48 KG/M2 | WEIGHT: 160.19 LBS

## 2021-06-02 NOTE — PROGRESS NOTES
Assessment:        1. Physical exam  Lipid [place]    Gynecologic Cytology (PAP Smear)   2. Need for hepatitis C screening test  Hepatitis C Antibody (Anti-HCV)   3. Screening for human immunodeficiency virus  HIV Antigen/Antibody Screening [place]   4. Need for immunization against influenza  Influenza, Recombinant, Inj, Quadrivalent, PF, 18+YRS   5. Osteopenia, unspecified location  Comprehensive Metabolic Panel    Thyroid Stimulating Hormone (TSH)    DXA Bone Density Scan   6. Dark stools  Comprehensive Metabolic Panel    HM2(CBC w/o Differential)    Iron and Transferrin Iron Binding Capacity    Ferritin   7. Class 1 obesity due to excess calories without serious comorbidity with body mass index (BMI) of 30.0 to 30.9 in adult  Comprehensive Metabolic Panel    Thyroid Stimulating Hormone (TSH)    Glycosylated Hemoglobin A1c   8. Encounter for screening mammogram for breast cancer  Mammo Screening Right   9. Personal history of malignant neoplasm of breast     10. Gastroesophageal reflux disease without esophagitis         Plan:      1. Healthcare maintenance: Pap smear is performed.  Order placed for mammogram of right breast.  Influenza vaccine administered.  She will check with insurance on coverage for shingles vaccine.  She was given a copy of advance directives and encouraged to complete.  Check lipids, glucose and hemoglobin today.  Screening for HIV and hepatitis C also performed.  Encouraged regular exercise and healthy diet and follow-up in 1 year for yearly physical  2. Osteopenia: She is due for follow-up bone density scan.  This is ordered  3. Dark stools: This has resolved.  We will check Hemoccult cards as well as hemogram, iron and ferritin levels  4. Obesity: Check A1c.  Encouraged weight loss efforts and regular exercise  5. History of breast cancer: She will continue with yearly mammograms.  Order placed  6. GERD: She will continue with H2 blocker.          Subjective:      Samir KYLE  Sada is a 64 y.o. female who presents for an annual exam.  We reviewed her health history.  She has history of left-sided breast cancer.  Previously treated with Arimidex.  Status post left mastectomy and reconstruction.  Previously treated with Arimidex.  This medication was discontinued about 3 years ago.  She no longer follows with her oncologist.  She continues to get regular mammograms.  She recently had some concern about a lump in the left breast area.  She was sent for ultrasound and no abnormalities were identified.  She was advised to follow-up with her breast surgeon if she had persistent concerns.  She reports no concerns with her right breast.  She is due for a Pap smear today.  She has not had any vaginal bleeding, abnormal discharge or pelvic pain.  Last Pap was in 2014.  She has never been sexually active.  She reports that she is exercising.  She goes to [employer] several times per week and tries to get her steps in every day.  She gets regular vision and dental exams.  She does have GERD and recently switched over to Pepcid due to recent concerns about Zantac.  She states that a few weeks ago she was noticing some black looking stools.  She otherwise felt well.  Had not had any dietary changes and was not sure what was causing the stools.  They have been normal for the past couple of weeks.  Review of systems is otherwise negative.  No other notes today.    Healthy Habits:   Regular Exercise: Yes  Sunscreen Use: Yes  Healthy Diet: Yes  Dental Visits Regularly: Yes  Seat Belt: Yes  Sexually active: N/A  Self Breast Exam Monthly:Yes  Hemoccults: No  Flex Sig: No  Colonoscopy: Yes  Lipid Profile: Yes  Glucose Screen: Yes  Prevention of Osteoporosis: Yes  Last Dexa: 2017        Immunization History   Administered Date(s) Administered     DT (pediatric) 10/25/2004     INFLUENZA,RECOMBINANT,INJ,PF QUADRIVALENT 18+YRS 10/11/2019     Influenza K2l5-33, 11/25/2009     Influenza, inj,  historic,unspecified 2008, 09/10/2014     Influenza,seasonal quad, PF, =/> 6months 10/07/2015, 2018     Td,adult,historic,unspecified 10/25/2004     Tdap 2015     Immunization status: up to date and documented.      Gynecologic History  No LMP recorded. Patient is postmenopausal.  Contraception: post menopausal status  Last Pap: . Results were: normal  Last mammogram: . Results were: normal      OB History    Para Term  AB Living       0         SAB TAB Ectopic Multiple Live Births                   Current Outpatient Medications   Medication Sig Dispense Refill     CALCIUM CARBONATE-VITAMIN D3 ORAL Take 1 tablet by mouth 2 (two) times a day. 500mg + 400iu vitamin d       famotidine (PEPCID) 20 MG tablet Take 20 mg by mouth 2 (two) times a day.       MULTIVITAMIN ORAL Take by mouth.       POLYETHYLENE GLYCOL 3350 ORAL Take 17 g by mouth daily.        vitamin E 400 UNIT capsule Take 1,000 Units by mouth daily.        No current facility-administered medications for this visit.      Past Medical History:   Diagnosis Date     Breast cancer (H) 2011    left breast     GERD (gastroesophageal reflux disease)      Past Surgical History:   Procedure Laterality Date     BREAST BIOPSY Left      CHOLECYSTECTOMY  1998     COSMETIC SURGERY  10/2011    reconstruction of left breast after masectomy     MASTECTOMY Left     implant left breast     REDUCTION MAMMAPLASTY Right      Erythromycin base  Family History   Problem Relation Age of Onset     Stroke Mother      Cancer Father      Lung cancer Father      Diabetes Brother      Hypertension Brother      Heart disease Brother      Diabetes Brother      Hypertension Brother      Heart disease Brother         atrial flutter     Arthritis Brother      Breast cancer Maternal Aunt      Breast cancer Cousin      Breast cancer Maternal Aunt      Social History     Socioeconomic History     Marital status: Single     Spouse name:  "Not on file     Number of children: Not on file     Years of education: Not on file     Highest education level: Not on file   Occupational History     Not on file   Social Needs     Financial resource strain: Not on file     Food insecurity:     Worry: Not on file     Inability: Not on file     Transportation needs:     Medical: Not on file     Non-medical: Not on file   Tobacco Use     Smoking status: Former Smoker     Packs/day: 0.50     Years: 15.00     Pack years: 7.50     Last attempt to quit: 12/10/2010     Years since quittin.8     Smokeless tobacco: Never Used     Tobacco comment: quit dec 2010   Substance and Sexual Activity     Alcohol use: No     Drug use: No     Sexual activity: Never   Lifestyle     Physical activity:     Days per week: Not on file     Minutes per session: Not on file     Stress: Not on file   Relationships     Social connections:     Talks on phone: Not on file     Gets together: Not on file     Attends Restorationism service: Not on file     Active member of club or organization: Not on file     Attends meetings of clubs or organizations: Not on file     Relationship status: Not on file     Intimate partner violence:     Fear of current or ex partner: Not on file     Emotionally abused: Not on file     Physically abused: Not on file     Forced sexual activity: Not on file   Other Topics Concern     Not on file   Social History Narrative     Not on file       Review of Systems    See HPI      Objective:         /80 (Patient Site: Right Arm, Patient Position: Sitting, Cuff Size: Adult Large)   Pulse (!) 59   Temp 97.5  F (36.4  C) (Oral)   Ht 5' 2.25\" (1.581 m)   Wt 168 lb 4 oz (76.3 kg)   SpO2 98%   BMI 30.53 kg/m        Physical Exam:  General Appearance: Alert, cooperative, no distress, appears stated age  Head: Normocephalic, without obvious abnormality, atraumatic  Eyes: PERRL, conjunctiva/corneas clear, EOM's intact  Ears: Normal TM's and external ear canals, both " ears  Nose: Nares normal, septum midline,mucosa normal, no drainage  Throat: Lips, mucosa, and tongue normal; teeth and gums normal  Neck: Supple, symmetrical, trachea midline, no adenopathy;  thyroid: not enlarged, symmetric, no tenderness/mass/nodules  Back: Symmetric, no curvature, ROM normal  Lungs: Clear to auscultation bilaterally, respirations unlabored  Breasts: No breast masses, tenderness, asymmetry, or nipple discharge in right breast, left breast implant present  Heart: Regular rate and rhythm, S1 and S2 normal, no murmur, rub, or gallop, Abdomen: Soft, non-tender, bowel sounds active all four quadrants,  no masses, no organomegaly  Pelvic:Normally developed genitalia with no external lesions or eruptions. Vagina and cervix show no lesions, inflammation, discharge or tenderness. No cystocele, No rectocele. Uterus normal.  No adnexal mass or tenderness.  Extremities: Extremities normal, atraumatic, no cyanosis or edema  Skin: Skin color, texture, turgor normal, no rashes or lesions  Lymph nodes: Cervical, supraclavicular, and axillary nodes normal  Neurologic: Normal

## 2021-06-02 NOTE — PATIENT INSTRUCTIONS - HE
Check [employer] website to learn about weight management options    Check with insurance for coverage on new shingles vaccine    Please complete advance directives and return copy to clinic

## 2021-06-03 VITALS
BODY MASS INDEX: 30.96 KG/M2 | HEART RATE: 59 BPM | HEIGHT: 62 IN | DIASTOLIC BLOOD PRESSURE: 80 MMHG | WEIGHT: 168.25 LBS | TEMPERATURE: 97.5 F | SYSTOLIC BLOOD PRESSURE: 121 MMHG | OXYGEN SATURATION: 98 %

## 2021-06-03 VITALS — BODY MASS INDEX: 30.19 KG/M2 | WEIGHT: 164.06 LBS | HEIGHT: 62 IN

## 2021-06-03 VITALS — HEIGHT: 62 IN | WEIGHT: 159.6 LBS | BODY MASS INDEX: 29.37 KG/M2

## 2021-06-04 VITALS
TEMPERATURE: 98.1 F | SYSTOLIC BLOOD PRESSURE: 104 MMHG | DIASTOLIC BLOOD PRESSURE: 65 MMHG | WEIGHT: 172.5 LBS | HEART RATE: 68 BPM | BODY MASS INDEX: 31.3 KG/M2 | OXYGEN SATURATION: 95 %

## 2021-06-04 NOTE — TELEPHONE ENCOUNTER
"Seen in October for physical, and discussed her sx with the Dr at that time. She states that sometimes she feels like she is having a problem getting a deep breath. Today it began between 1-2 pm, and still has sx. Shortness of breath every once in awhile. Shortness of breath both at rest and with activity. It goes away. It may last for up to an hour\". When she gets up at night it goes away, and then can go back to sleep.     Reason for Disposition    [1] MODERATE difficulty breathing (e.g., speaks in phrases, SOB even at rest, pulse 100-120) AND [2] NEW-onset or WORSE than normal    Protocols used: BREATHING DIFFICULTY-A-AH    Triaged to a disposition of Go to ED now. Patient refuses to go to the ER at this time, she does not feel it is an emergency. However, if her sx worsen, then she will go to the ER. She requested and was given an appointment with her PCP for next Weds afternoon.     Lennie Cheney RN Triage Nurse Advisor  "

## 2021-06-04 NOTE — TELEPHONE ENCOUNTER
----- Message from Ruthie Solis MD sent at 12/16/2019 12:59 PM CST -----  Please let patient know that bone density scan shows that she has low bone density.  Bone density has remained stable since 2017.  At this time, I continue to recommend adequate calcium and vitamin D intake and regular weightbearing exercise.  A follow-up bone density scan in 2 years is also recommended.

## 2021-06-05 VITALS
OXYGEN SATURATION: 99 % | HEART RATE: 60 BPM | WEIGHT: 165.31 LBS | SYSTOLIC BLOOD PRESSURE: 120 MMHG | TEMPERATURE: 98 F | DIASTOLIC BLOOD PRESSURE: 70 MMHG | BODY MASS INDEX: 30.42 KG/M2 | HEIGHT: 62 IN

## 2021-06-05 NOTE — PATIENT INSTRUCTIONS - HE
Restart omeprazole 20 mg daily.  Continue for 4-6 weeks    Return to clinic for chest xray    Let me know if symptoms not improving with omeprazole

## 2021-06-05 NOTE — PROGRESS NOTES
Assessment/Plan:     1. Shortness of breath  XR Chest 2 Views   2. Gastroesophageal reflux disease without esophagitis     3. Hiatal hernia         Diagnoses and all orders for this visit:    Shortness of breath  -     XR Chest 2 Views; Future; Expected date: 01/08/2020  -X-ray is not currently available in clinic so we will have her return to clinic tomorrow for x-ray to evaluate for pneumonia or respiratory infection as cause of shortness of breath and cough.  We will notify her of results when available.    Gastroesophageal reflux disease without esophagitis  She does have known history of hiatal hernia.  Discussed that chronic cough as well as sensation of something being stuck in her throat could be due to uncontrolled acid reflux.  If feel that this is the likely cause of her symptoms we will have her start omeprazole daily and continue this for 4 to 6 weeks.  She will notify me if symptoms are not improving with omeprazole.  If she continues to have sensation that something catches in her throat as well as acid reflux, we will plan to repeat EGD.  She did have EGD done many years ago    Hiatal hernia  Discussed that this can predispose to acid reflux.  Will start omeprazole daily as discussed above             Subjective:      Samir Tomlin is a 64 y.o. female who comes in today with concern about cough, sensation of shortness of breath as well as nasal drainage.  She was last seen in clinic in October.  We reviewed medications and allergies and updated the chart.  She does have history of acid reflux as well as a hiatal hernia.  She is currently on Pepcid.  She reports that in mid December she developed a cold.  She continues to have a little bit of a cough and postnasal drainage.  She describes a couple of times when she woke up at night and she felt like it was difficult to breathe.  She felt like her throat was closing.  States that she really had to concentrate on her breathing in order to  take deep breaths.  This has continued to occur.  Typically will happen about 2 to 3:00 in the morning and then again about 1:59 in the afternoon.  She has noticed a little bit of breathlessness with walking at times.  About a week ago she stopped taking all of her medications because she thought that she just needed to give her body a break and see what would happen.  Since then she has been taking Tums.  She has not had any lower extremity edema.  She has had occasional gas and bloating.  She has not had sore throat.  Sinus and ears are okay.  Cough is productive in the morning of clear phlegm.  She has had clear mucus from her nose.  She has not had fevers or chills.  No chest tightness.  She is not sure if symptoms are due to acid reflux, her sinuses or allergies.  She is also tried taking Claritin and Zyrtec without relief.  Review of systems is assessed and is otherwise negative.  No other concerns or questions today.    Current Outpatient Medications   Medication Sig Dispense Refill     CALCIUM CARBONATE-VITAMIN D3 ORAL Take 1 tablet by mouth 2 (two) times a day. 500mg + 400iu vitamin d       famotidine (PEPCID) 20 MG tablet Take 20 mg by mouth 2 (two) times a day.       MULTIVITAMIN ORAL Take by mouth.       POLYETHYLENE GLYCOL 3350 ORAL Take 17 g by mouth daily.        vitamin E 400 UNIT capsule Take 1,000 Units by mouth daily.        No current facility-administered medications for this visit.        Past Medical History, Family History, and Social History reviewed.  Past Medical History:   Diagnosis Date     Acute Post-traumatic Stress Disorder      Breast cancer (H) feb 14 2011    left breast     Generalized anxiety disorder      GERD (gastroesophageal reflux disease)      Past Surgical History:   Procedure Laterality Date     BREAST BIOPSY Left      CHOLECYSTECTOMY  1/1998     COSMETIC SURGERY  10/2011    reconstruction of left breast after masectomy     MASTECTOMY Left 2011    implant left breast      REDUCTION MAMMAPLASTY Right 2011     Erythromycin base  Family History   Problem Relation Age of Onset     Stroke Mother      Cancer Father      Lung cancer Father      Diabetes Brother      Hypertension Brother      Heart disease Brother      Diabetes Brother      Hypertension Brother      Heart disease Brother         atrial flutter     Arthritis Brother      Breast cancer Maternal Aunt      Breast cancer Cousin      Breast cancer Maternal Aunt      Social History     Socioeconomic History     Marital status: Single     Spouse name: Not on file     Number of children: Not on file     Years of education: Not on file     Highest education level: Not on file   Occupational History     Not on file   Social Needs     Financial resource strain: Not on file     Food insecurity:     Worry: Not on file     Inability: Not on file     Transportation needs:     Medical: Not on file     Non-medical: Not on file   Tobacco Use     Smoking status: Former Smoker     Packs/day: 0.50     Years: 15.00     Pack years: 7.50     Last attempt to quit: 12/10/2010     Years since quittin.0     Smokeless tobacco: Never Used     Tobacco comment: quit dec 2010   Substance and Sexual Activity     Alcohol use: No     Drug use: No     Sexual activity: Never   Lifestyle     Physical activity:     Days per week: Not on file     Minutes per session: Not on file     Stress: Not on file   Relationships     Social connections:     Talks on phone: Not on file     Gets together: Not on file     Attends [employer] service: Not on file     Active member of club or organization: Not on file     Attends meetings of clubs or organizations: Not on file     Relationship status: Not on file     Intimate partner violence:     Fear of current or ex partner: Not on file     Emotionally abused: Not on file     Physically abused: Not on file     Forced sexual activity: Not on file   Other Topics Concern     Not on file   Social History Narrative     Not on file          Review of systems is as stated in HPI, and the remainder of the 10 system review is otherwise negative.    Objective:     Vitals:    01/08/20 1454   BP: 104/65   Patient Site: Right Arm   Patient Position: Sitting   Cuff Size: Adult Large   Pulse: 68   Temp: 98.1  F (36.7  C)   TempSrc: Oral   SpO2: 95%   Weight: 172 lb 8 oz (78.2 kg)    Body mass index is 31.3 kg/m .    General appearance: alert, appears stated age and cooperative  Head: Normocephalic, without obvious abnormality, atraumatic  Eyes: negative  Ears: normal TM's and external ear canals both ears  Nose: Nares normal. Septum midline. Mucosa normal. No drainage or sinus tenderness.  Throat: lips, mucosa, and tongue normal; teeth and gums normal  Neck: no adenopathy  Lungs: clear to auscultation bilaterally  Heart: regular rate and rhythm, S1, S2 normal, no murmur, click, rub or gallop  Extremities: extremities normal, atraumatic, no cyanosis or edema        This note has been dictated using voice recognition software. Any grammatical or context distortions are unintentional and inherent to the the software.

## 2021-06-05 NOTE — TELEPHONE ENCOUNTER
----- Message from Ruthie Solis MD sent at 1/10/2020  7:34 AM CST -----  Please let pt know that chest xray shows moderate hiatal hernia.  Heart and lungs appear normal.  The hiatal hernia is the likely cause of symptoms. I recommend that she go back on omeprazole as we discussed.

## 2021-06-12 NOTE — PROGRESS NOTES
Assessment/Plan:     1. Pain of right heel  XR Calcaneus Right 2 or More Views   2. Esophageal reflux     3. Hiatal hernia         Diagnoses and all orders for this visit:    Pain of right heel  -     XR Calcaneus Right 2 or More Views  -Discussed x-ray findings with patient.  Discussed options for treatment.  Recommend rest and changing up her exercise regimen so that she is doing other activities and the elliptical machine which causes her to have increased discomfort.  Also recommend ice.  Cautious use of anti-inflammatory medications given her underlying esophageal reflux.  Discussed we could also have her try physical therapy or see a podiatrist.  At this time, she is just happy to know the x-ray findings and prefers to continue with her current management.  She has found relief with use of memory foam shoes.  She will contact me if she would like a referral for podiatry or physical therapy.    Esophageal reflux/hiatal hernia  -Discussed findings from upper endoscopy that was performed in 2013.  She does have a small hiatal hernia.  Discussed that she will be prone to esophageal reflux because of this and may always need to take omeprazole.  However, agreed that it would be good to try to minimize use and discontinue the medication if possible.  Will have her take omeprazole every other day and start ranitidine or famotidine in its place.  Discussed importance of dietary modifications in managing symptoms of esophageal reflux.  Discussed that if she does have a flareup of symptoms she could step up therapy to omeprazole and take this for a couple of weeks until symptoms improve and then return to the H2 blocker for daily use.          Subjective:      Samir Tomlin is a 62 y.o. female who comes in today with concern about right heel pain.  This is been going on for about a month.  No prior history of heel pain.  She does have a bunion of the right foot.  She has recently started exercising more.   Started noticing pain in her heel when she would first get up after sitting down.  It has now become more constant.  She has tried taking Aleve but this does not provide much relief.  She purchased some memory foam shoes.  She also has had some muscle cramping and so has been massaging her right calf and noticed a lump.  This is not painful.  She is not sure if it is in the muscle.  She has a history of cysts and wonders if it could be a cyst.  She has not had redness or swelling of the heel in the region of the pain.  No redness or swelling in the right calf.  No chest pain or dyspnea.  She also would like to discuss esophageal reflux.  She is currently on omeprazole 20 mg daily.  Has been advised by friends and family that she should discontinue this medication.  For the most part it keeps her heartburn under good control.  Will occasionally have flareups.  She had upper endoscopy in 2013 that showed small hiatal hernia.  We reviewed allergies and medications.  Review of systems otherwise negative.  She has no other concerns or questions today.    Current Outpatient Prescriptions   Medication Sig Dispense Refill     CALCIUM CARBONATE-VITAMIN D3 ORAL Take 1 tablet by mouth 2 (two) times a day. 500mg + 400iu vitamin d       MULTIVITAMIN ORAL Take by mouth.       omeprazole (PRILOSEC) 20 MG capsule Take 1 capsule (20 mg total) by mouth 2 (two) times a day. (Patient taking differently: Take 20 mg by mouth daily. ) 180 capsule 0     POLYETHYLENE GLYCOL 3350 ORAL Take 17 g by mouth daily.        vitamin E 400 UNIT capsule Take 1,000 Units by mouth daily.        No current facility-administered medications for this visit.        Past Medical History, Family History, and Social History reviewed.  Past Medical History:   Diagnosis Date     Breast cancer feb 14 2011    left breast     Past Surgical History:   Procedure Laterality Date     BREAST BIOPSY Left      MASTECTOMY Left 2011    implant left breast     REDUCTION  MAMMAPLASTY Right 2011     Alcohol and Erythromycin base  Family History   Problem Relation Age of Onset     Stroke Mother      Cancer Father      Lung cancer Father      Diabetes Brother      Hypertension Brother      Diabetes Brother      Hypertension Brother      No Medical Problems Daughter      No Medical Problems Maternal Grandmother      No Medical Problems Maternal Grandfather      No Medical Problems Paternal Grandmother      No Medical Problems Paternal Grandfather      Breast cancer Maternal Aunt      No Medical Problems Paternal Aunt      BRCA 1/2 Neg Hx      Colon cancer Neg Hx      Endometrial cancer Neg Hx      Ovarian cancer Neg Hx      Social History     Social History     Marital status: Single     Spouse name: N/A     Number of children: N/A     Years of education: N/A     Occupational History     Not on file.     Social History Main Topics     Smoking status: Former Smoker     Packs/day: 0.50     Quit date: 12/10/2010     Smokeless tobacco: Never Used      Comment: quit dec 2010     Alcohol use No     Drug use: No     Sexual activity: No     Other Topics Concern     Not on file     Social History Narrative         Review of systems is as stated in HPI, and the remainder of the 10 system review is otherwise negative.    Objective:     Vitals:    08/04/17 0750   BP: 110/62   Patient Site: Right Arm   Patient Position: Sitting   Cuff Size: Adult Large   Pulse: (!) 56   Temp: 97.8  F (36.6  C)   TempSrc: Tympanic   SpO2: 99%   Weight: 154 lb 8 oz (70.1 kg)    Body mass index is 27.81 kg/(m^2).    General appearance: alert, appears stated age and cooperative  Extremities: extremities normal, atraumatic, no cyanosis or edema and tenderness to palpation right posterior aspect of heel  Neurologic: Grossly normal    Results: X-ray of right heel was performed in clinic.  I personally reviewed this x-ray.  Posterior calcaneal heel spur noted.  No other bony abnormalities present    This note has been  dictated using voice recognition software. Any grammatical or context distortions are unintentional and inherent to the the software.

## 2021-06-13 NOTE — PROGRESS NOTES
[facility] Progress Note    Patient: Samir Tomlin  MRN: [id number]  Date of Service:  October 16, 2017      Assessment and Plan:    Malignant neoplasm of breast (female), unspecified site    Primary site: Breast (Left)    Staging method: AJCC 7th Edition    Pathologic free text: ER+/SD+/Her2-    Pathologic: Stage IB (T1a, N1mi, cM0) - Signed by Stacy Richard CNP on 10/10/2014    Summary: Stage IB (T1a, N1mi, cM0)    1. Stage I invasive ductal carcinoma: She has been off adjuvant endocrine therapy for about a year and a half.  She is feeling fine.  Recent mammogram normal.  Given her initial staging, she has had a very low risk of recurrent disease.  She will not need to follow-up with us in the oncology clinic routinely anymore.  I asked that she see her primary care provider every 6-12 months.  She will continue to get routine right-sided mammograms every May.  Bone density scan every other year.  She will continue to take supplemental calcium and vitamin D.  Questions were answered.    ECOG Performance   ECOG Performance Status: 0    Distress Assessment  Distress Assessment Score: No distress    Pain  Currently in Pain: No/denies    Diagnosis:    T1a N1 (mi) M0 stage I invasive ductal carcinoma of the left breast. ER/SD positive, HER-2 negative. The invasive component was 1.6 mm. But she did have a lymph node with a micromet measuring 1.8 mm.     Treatment:    Lumpectomy in 1/2011. She then had a left-sided mastectomy 02/16/2011. Implant placed on the left 10/2011.   Tamoxifen was started in 3/2011 but was stopped secondary to side effects in April of 2011 at which time Arimidex was started.   Arimidex was stopped in April, 2016    Interim History:    Evelin presents today for a follow-up visit.  She was last seen a year ago.  In the interim she has been doing well.  Occasional night sweats and hot flashes.  Has chronic heartburn recently started on Zantac.  Also some  constipation.  Still working on strength training and weight loss.  No headaches, cough, chest pain, or shortness of breath.    Review of Systems:    Constitutional  Constitutional (WDL): Exceptions to WDL  Neurosensory  Neurosensory (WDL): All neurosensory elements are within defined limits  Cardiovascular  Cardiovascular (WDL): All cardiovascular elements are within defined limits  Pulmonary  Respiratory (WDL): Within Defined Limits  Gastrointestinal  Gastrointestinal (WDL): Exceptions to WDL  Constipation: Occasional or intermittent symptoms, occasional use of stool softeners, laxatives, dietary modification, or enema  Esophagitis: Symptomatic, altered eating/swallowing, oral supplements indicated  Genitourinary  Genitourinary (WDL): All genitourinary elements are within defined limits  Integumentary  Integumentary (WDL): All integumentary elements are within defined limits  Patient Coping  Patient Coping: Accepting  Accompanied by  Accompanied by: Alone    Past History:    Past Medical History:   Diagnosis Date     Breast cancer feb 14 2011    left breast     Physical Exam:    Recent Vitals 10/16/2017   Height -   Weight 160 lbs 8 oz   BSA (m2) -   /61   Pulse 62   Temp -   Temp src -   SpO2 97   Some recent data might be hidden     General: patient appears stated age of 62 y.o.. Nontoxic and in no distress.   HEENT: Head: atraumatic, normocephalic. Sclerae anicteric.  Chest:  Normal respiratory effort  Cardiac:  No edema.   Abdomen: abdomen is non-distended  Extremities: normal tone and muscle bulk.  Skin: no lesions or rash. Warm and dry.    CNS: alert and oriented x3. Grossly non-focal.   Psychiatric: normal mood and affect.     Lab Results:    Recent Results (from the past 168 hour(s))   Comprehensive Metabolic Panel   Result Value Ref Range    Sodium 140 136 - 145 mmol/L    Potassium 4.3 3.5 - 5.0 mmol/L    Chloride 110 (H) 98 - 107 mmol/L    CO2 23 22 - 31 mmol/L    Anion Gap, Calculation 7 5 - 18  mmol/L    Glucose 99 70 - 125 mg/dL    BUN 15 8 - 22 mg/dL    Creatinine 0.78 0.60 - 1.10 mg/dL    GFR MDRD Af Amer >60 >60 mL/min/1.73m2    GFR MDRD Non Af Amer >60 >60 mL/min/1.73m2    Bilirubin, Total 0.5 0.0 - 1.0 mg/dL    Calcium 9.4 8.5 - 10.5 mg/dL    Protein, Total 6.8 6.0 - 8.0 g/dL    Albumin 3.7 3.5 - 5.0 g/dL    Alkaline Phosphatase 69 45 - 120 U/L    AST 43 (H) 0 - 40 U/L    ALT 35 0 - 45 U/L   HM1 (CBC with Diff)   Result Value Ref Range    WBC 4.9 4.0 - 11.0 thou/uL    RBC 4.76 3.80 - 5.40 mill/uL    Hemoglobin 12.7 12.0 - 16.0 g/dL    Hematocrit 38.8 35.0 - 47.0 %    MCV 82 80 - 100 fL    MCH 26.7 (L) 27.0 - 34.0 pg    MCHC 32.7 32.0 - 36.0 g/dL    RDW 14.7 (H) 11.0 - 14.5 %    Platelets 203 140 - 440 thou/uL    MPV 11.8 8.5 - 12.5 fL    Neutrophils % 53 50 - 70 %    Lymphocytes % 32 20 - 40 %    Monocytes % 12 (H) 2 - 10 %    Eosinophils % 3 0 - 6 %    Basophils % 1 0 - 2 %    Neutrophils Absolute 2.6 2.0 - 7.7 thou/uL    Lymphocytes Absolute 1.6 0.8 - 4.4 thou/uL    Monocytes Absolute 0.6 0.0 - 0.9 thou/uL    Eosinophils Absolute 0.1 0.0 - 0.4 thou/uL    Basophils Absolute 0.0 0.0 - 0.2 thou/uL      Imaging:    Dxa Bone Density Scan    Result Date: 10/14/2017  10/6/2017 RE: Samir Tomlin YOB: 1955 Dear Willy Truong, Patient Profile: 62 y.o. female, postmenopausal, is here for the first bone density test. History of fractures - None. Family history of osteoporosis - None.  Family history of hip fracture: None. Smoking history - Past. Osteoporosis treatment past -  No. Osteoporosis treatment current - Yes;  HRT.  Chronic medical problems - Breast cancer. High risk medications -  Aromatase Inhibitor;  Yes, in the Past. Assessment: 1. The spine bone density L1-L3(L2) with T-score -0.5. 2. Femoral bone densities show left femoral neck T- score -1.3 and right femoral neck T-score -1.2. 3. Trabecular bone score indicates good trabecular bone architecture. 62 y.o. female with LOW  BONE DENSITY (OSTEOPENIA) and LOW fracture risk with major osteoporotic fracture risk 8.0% and hip fracture risk 0.6%. Previous scan was done on a different machine and is not directly comparable with the current study. Recommendations: Appropriate calcium, vitamin D supplements, along with balance and weight bearing exercise recommended with follow up bone density scan in 2 years. Bone densitometry was performed on your patient using our Get[employer] iDXA densitometer. The results are summarized and a copy of the actual scans are included for your review. In conformity with the International Society of Clinical Densitometry's most recent position statement for DXA interpretation (2015), the diagnosis will be made on the lowest measured T-score of the lumbar spine, femoral neck, total proximal femur or 33% radius. Note the change in terminology for diagnostic classification from OSTEOPENIA to LOW BONE MASS. All trending for sequential exams will be done using multiple vertebrae or the total proximal femur. Fracture risk is based on the WHO Fracture Risk Assessment Tool (FRAX). If additional information is needed or if you would like to discuss the results, please do not hesitate to call me. Thank you for referring this patient to [facility]. We are happy to be of service in support of you and your practice. If you have any questions or suggestions to improve our service, please call me at [phone]. Sincerely, Kizzy Le M.D. [facility]. [facility], [facility]     Mammo Screening Right    Result Date: 10/6/2017  RIGHT FULL FIELD DIGITAL SCREENING MAMMOGRAM Performed on: 10/6/17 Compared to: 10/05/2016 Mammo Screening Right, and 09/30/2015 Mammo Screening Right Findings: The patient is status post left mastectomy. The right breast has scattered areas of fibroglandular densities. There is no radiographic evidence of malignancy.  This study was evaluated with the assistance of  Computer-Aided Detection. Continue routine screening mammogram as recommended. ACR BI-RADS Category 1: Negative      Signed by: Willy Truong MD

## 2021-06-15 NOTE — PROGRESS NOTES
[facility] [place]  [address]  [facility] [address]  Dept: [phone]  Dept Fax: [phone]  Primary Provider: Ruthie Hutson MD  Pre-op Performing Provider: RUTHIE HUTSON    PREOPERATIVE EVALUATION:  Today's date: 3/1/2021    Samir Tomlin is a 65 y.o. female who presents for a preoperative evaluation.    Surgical Information:  Surgery/Procedure: PHaco w/IOL   Surgery Location: [facility]  Surgeon: Left eye 3/8/2021 and right eye 3/29/2021  Surgery Date: 3/8/2021 and 3/28/2021  Time of Surgery: unknown  Where patient plans to recover: At home with family  Fax number for surgical facility: [phone]    Type of Anesthesia Anticipated: to be determined    Assessment & Plan      The proposed surgical procedure is considered LOW risk.    Samir was seen today for pre-op exam.    Diagnoses and all orders for this visit:    Pre-operative examination  No contraindications or significant risk factors for planned procedure.    Age-related cataract of both eyes, unspecified age-related cataract type  Okay to proceed with planned procedure    Mixed hyperlipidemia  -     Lipid Cascade FASTING  -Has history of mild hyperlipidemia.  Will check lipid cascade today.  Encouraged regular exercise and ongoing weight loss efforts    Overweight (BMI 25.0-29.9)  -     Glucose  -Check glucose and lipids.  Encouraged regular exercise and ongoing weight loss efforts    Low iron stores  -     Hemoglobin  -     Ferritin  -     Iron and Transferrin Iron Binding Capacity  -Previous history of low iron stores.  We will recheck today.               Medication Instructions:  Patient is to take all scheduled medications on the day of surgery    RECOMMENDATION:  APPROVAL GIVEN to proceed with proposed procedure, without further diagnostic evaluation.  [id number]}    Subjective     HPI related to upcoming procedure: She has bilateral cataracts that are interfering her vision.  Scheduled for  bilateral cataract surgery.  Reviewed her health history including medications, allergies, family and social history and updated the chart.  She has history of breast cancer and is status post left mastectomy.  She has history of GERD and takes Pepcid.  She has history of osteopenia and takes a calcium and vitamin D supplement.  She also has history of low iron stores.  She did take an iron supplement.  She recently joined weight watchers and has been working on weight loss.  Currently feeling well and denies signs or symptoms of acute illness.  She has no other concerns today.    Preop Questions 2/22/2021   Have you ever had a heart attack or stroke? No   Have you ever had surgery on your heart or blood vessels, such as a stent placement, a coronary artery bypass, or surgery on an artery in your head, neck, heart, or legs? No   Do you have chest pain with activity? No   Do you have a history of  heart failure? No   Do you currently have a cold, bronchitis or symptoms of other infection? No   Do you have a cough, shortness of breath, or wheezing? No   Do you or anyone in your family have previous history of blood clots? No   Do you or does anyone in your family have a serious bleeding problem such as prolonged bleeding following surgeries or cuts? No   Have you ever had problems with anemia or been told to take iron pills? YES - due to low iron stores   Have you had any abnormal blood loss such as black, tarry or bloody stools, or abnormal vaginal bleeding? No   Have you ever had a blood transfusion? No   Are you willing to have a blood transfusion if it is medically needed before, during, or after your surgery? Yes   Have you or any of your relatives ever had problems with anesthesia? No   Do you have sleep apnea, excessive snoring or daytime drowsiness? No   Do you have any artifical heart valves or other implanted medical devices like a pacemaker, defibrillator, or continuous glucose monitor? No   Do you have  artificial joints? No   Are you allergic to latex? No     Health Care Directive:  Patient does not have a Health Care Directive or Living Will: Discussed advance care planning with patient; information given to patient to review.       See problem list for active medical problems.  Problems all longstanding and stable, except as noted/documented.  See ROS for pertinent symptoms related to these conditions.      Review of Systems  CONSTITUTIONAL: NEGATIVE for fever, chills, change in weight  INTEGUMENTARY/SKIN: NEGATIVE for worrisome rashes, moles or lesions  EYES: vision changes due to cataracts  ENT/MOUTH: NEGATIVE for ear, mouth and throat problems  RESP: NEGATIVE for significant cough or SOB  BREAST: NEGATIVE for masses, tenderness or discharge  CV: NEGATIVE for chest pain, palpitations or peripheral edema  GI: NEGATIVE for nausea, abdominal pain, heartburn, or change in bowel habits  : NEGATIVE for frequency, dysuria, or hematuria  MUSCULOSKELETAL: NEGATIVE for significant arthralgias or myalgia  NEURO: NEGATIVE for weakness, dizziness or paresthesias  ENDOCRINE: NEGATIVE for temperature intolerance, skin/hair changes  HEME: NEGATIVE for bleeding problems  PSYCHIATRIC: NEGATIVE for changes in mood or affect    Patient Active Problem List    Diagnosis Date Noted     Personal history of malignant neoplasm of breast 10/11/2019     GERD (gastroesophageal reflux disease) 10/11/2019     Hiatal hernia 08/04/2017     Malignant neoplasm of breast (female), unspecified site 10/10/2014     Generalized Anxiety Disorder      Acute Post-traumatic Stress Disorder      Esophageal Reflux      Osteopenia      Past Medical History:   Diagnosis Date     Acute Post-traumatic Stress Disorder      Breast cancer (H) feb 14 2011    left breast     Generalized anxiety disorder      GERD (gastroesophageal reflux disease)      Past Surgical History:   Procedure Laterality Date     BREAST BIOPSY Left      CHOLECYSTECTOMY  1/1998      "COSMETIC SURGERY  10/2011    reconstruction of left breast after masectomy     MASTECTOMY Left 2011    implant left breast     REDUCTION MAMMAPLASTY Right 2011     Current Outpatient Medications   Medication Sig Dispense Refill     CALCIUM CARBONATE-VITAMIN D3 ORAL Take 1 tablet by mouth 2 (two) times a day. 500mg + 400iu vitamin d       famotidine (PEPCID) 20 MG tablet Take 20 mg by mouth 2 (two) times a day.       MULTIVITAMIN ORAL Take by mouth.       POLYETHYLENE GLYCOL 3350 ORAL Take 17 g by mouth daily.        vitamin E 400 UNIT capsule Take 1,000 Units by mouth daily.        No current facility-administered medications for this visit.        Allergies   Allergen Reactions     Erythromycin Base        Social History     Tobacco Use     Smoking status: Former Smoker     Packs/day: 0.50     Years: 15.00     Pack years: 7.50     Quit date: 12/10/2010     Years since quitting: 10.2     Smokeless tobacco: Never Used     Tobacco comment: quit dec 2010   Substance Use Topics     Alcohol use: No      Family History   Problem Relation Age of Onset     Stroke Mother      Cancer Father      Lung cancer Father      Diabetes Brother      Hypertension Brother      Heart disease Brother      Diabetes Brother      Hypertension Brother      Heart disease Brother         atrial flutter     Arthritis Brother      Breast cancer Maternal Aunt      Breast cancer Cousin      Breast cancer Maternal Aunt      Social History     Substance and Sexual Activity   Drug Use No        Objective     /70 (Patient Site: Right Arm, Patient Position: Sitting, Cuff Size: Adult Regular)   Pulse 60   Temp 98  F (36.7  C) (Temporal)   Ht 5' 2.25\" (1.581 m)   Wt 165 lb 5 oz (75 kg)   SpO2 99%   BMI 29.99 kg/m    Physical Exam    GENERAL APPEARANCE: healthy, alert and no distress     EYES: EOMI, PERRL     HENT: ear canals and TM's normal     NECK: no adenopathy, no asymmetry, masses, or scars and thyroid normal to palpation     RESP: lungs " clear to auscultation - no rales, rhonchi or wheezes     CV: regular rates and rhythm, normal S1 S2, no S3 or S4 and no murmur, click or rub     ABDOMEN:  soft, nontender, no HSM or masses and bowel sounds normal     MS: extremities normal- no gross deformities noted, no evidence of inflammation in joints, FROM in all extremities.     SKIN: no suspicious lesions or rashes     NEURO: Normal strength and tone, sensory exam grossly normal, mentation intact and speech normal     PSYCH: mentation appears normal. and affect normal/bright     LYMPHATICS: No cervical adenopathy    Recent Labs   Lab Test 10/11/19  0841 05/28/19  1303   HGB 13.7 13.5    197     --    K 4.4  --    CREATININE 0.78  --         PRE-OP Diagnostics:   Labs pending at this time. Results will be reviewed when available.  No EKG required for low risk surgery (cataract, skin procedure, breast biopsy, etc).    REVISED CARDIAC RISK INDEX (RCRI)   The patient has the following serious cardiovascular risks for perioperative complications:   - No serious cardiac risks = 0 points    RCRI INTERPRETATION: 0 points: Class I (very low risk - 0.4% complication rate)           Signed Electronically by: Ruthie Solis MD    Copy of this evaluation report is provided to requesting physician.    [facility]    Revised Cardiac Risk Index

## 2021-06-16 PROBLEM — K21.9 GERD (GASTROESOPHAGEAL REFLUX DISEASE): Status: ACTIVE | Noted: 2019-10-11

## 2021-06-16 PROBLEM — K44.9 HIATAL HERNIA: Status: ACTIVE | Noted: 2017-08-04

## 2021-06-16 PROBLEM — Z85.3 PERSONAL HISTORY OF MALIGNANT NEOPLASM OF BREAST: Status: ACTIVE | Noted: 2019-10-11

## 2021-06-20 NOTE — PROGRESS NOTES
Assessment:        1. Health care maintenance  Lipid Cascade FASTING    Hemoglobin    CANCELED: Gynecologic Cytology (PAP Smear)   2. Visit for screening mammogram  Mammo Screening Right   3. Osteopenia  Comprehensive Metabolic Panel    Vitamin D, Total (25-Hydroxy)   4. Hot flashes  Thyroid Stimulating Hormone (TSH)   5. Esophageal reflux     6. History of left breast cancer         Plan:      1. Healthcare maintenance: Check fasting lipids, glucose and hemoglobin.  She does not need Pap smear.  Overall low risk for cervical cancer as she has never had sexual intercourse.  She will schedule mammogram.  She is up-to-date on colonoscopy.  Influenza vaccine administered.  Encouraged shingles vaccine.  Continue with regular vision and dental exams.  Encouraged regular exercise, healthy diet and adequate calcium intake.  2. Osteopenia: Check vitamin D and comprehensive metabolic panel.  Due for DEXA scan in 2019.  Encouraged regular exercise and adequate calcium intake.  3. Hot flashes: Provided reassurance.  Discussed ways to manage hot flashes.  We will also check TSH  4. Esophageal reflux: Discussed dietary modifications.  Continue ranitidine 150 mg twice daily.  Continue Tums as needed.  Discussed that for flareups of reflux she can step up therapy to short course of omeprazole until symptoms are controlled.  Can consider follow-up with GI for further evaluation if desired  5. History of left breast cancer: Has completed treatment.  Continue with yearly mammograms.  Order placed for mammogram.          Subjective:      Samir Tomlin is a 63 y.o. female who presents for an annual exam.  We reviewed her health history.  She has history of left-sided breast cancer.  Previously treated with Arimidex.  This medication was discontinued about 2 years ago.  She last saw her oncologist approximately 1 year ago.  Was advised that she could discontinue regular oncology follow-ups and see primary care provider every  6-12 months for breast exams.  Was advised to continue with yearly mammograms.  She did have some bloody vaginal discharge when she discontinued the Arimidex.  She did see a gynecologist and endometrial biopsy was performed.  This was negative.  Has not had any further vaginal bleeding.  No pelvic pain.  Last Pap was in 2014.  She has never been sexually active.  We reviewed her medications as well as her allergies.  Also reviewed and updated family history and social history.  She gets regular vision and dental exams.  She does have history of osteopenia.  Last DEXA scan was in 2017.  2-year follow-up recommended.  She is due for mammogram.  Colonoscopy is up-to-date.  On review of systems, she continues to complain of symptoms of esophageal reflux.  Currently takes ranitidine 150 mg 2-3 times daily.  Also uses Tums.  Reflux symptoms seem to be worse with certain foods.  She has had prior endoscopy which did show small hiatal hernia.  She also is bothered by hot flashes.  Typically wakes up about 230 or 3:00 every morning with a hot flash.  Does not typically experience them much during the day.  She is not sure if any medications would be helpful.  She does feel like she gets adequate rest.  Review of systems is otherwise negative.  No other concerns.    Healthy Habits:   Regular Exercise: Yes  Sunscreen Use: Yes  Healthy Diet: Yes  Dental Visits Regularly: Yes  Seat Belt: Yes  Sexually active: No  Self Breast Exam Monthly:Yes  Hemoccults: No  Flex Sig: No  Colonoscopy: Yes  Lipid Profile: Yes  Glucose Screen: Yes  Prevention of Osteoporosis: Yes  Last Dexa: 2017      Immunization History   Administered Date(s) Administered     DT (pediatric) 10/25/2004     Influenza [id number], 11/25/2009     Influenza, inj, historic,unspecified 11/06/2008, 09/10/2014     Influenza,seasonal quad, PF, 36+MOS 10/07/2015, 09/24/2018     Td,adult,historic,unspecified 10/25/2004     Tdap 01/19/2015     Immunization status: up to date and  documented.      Gynecologic History  No LMP recorded. Patient is postmenopausal.  Contraception: post menopausal status  Last Pap: . Results were: normal  Last mammogram: . Results were: normal      OB History    Para Term  AB Living     0      SAB TAB Ectopic Multiple Live Births                    Current Outpatient Prescriptions   Medication Sig Dispense Refill     CALCIUM CARBONATE-VITAMIN D3 ORAL Take 1 tablet by mouth 2 (two) times a day. 500mg + 400iu vitamin d       MULTIVITAMIN ORAL Take by mouth.       POLYETHYLENE GLYCOL 3350 ORAL Take 17 g by mouth daily.        ranitidine (ZANTAC) 150 MG tablet Take 150 mg by mouth daily.       vitamin E 400 UNIT capsule Take 1,000 Units by mouth daily.        No current facility-administered medications for this visit.      Past Medical History:   Diagnosis Date     Breast cancer (H) 2011    left breast     Past Surgical History:   Procedure Laterality Date     BREAST BIOPSY Left      MASTECTOMY Left 2011    implant left breast     REDUCTION MAMMAPLASTY Right 2011     Erythromycin base  Family History   Problem Relation Age of Onset     Stroke Mother      Cancer Father      Lung cancer Father      Diabetes Brother      Hypertension Brother      Diabetes Brother      Hypertension Brother      Heart disease Brother      atrial flutter     Arthritis Brother      Breast cancer Maternal Aunt      Breast cancer Cousin      Social History     Social History     Marital status: Single     Spouse name: N/A     Number of children: N/A     Years of education: N/A     Occupational History     Not on file.     Social History Main Topics     Smoking status: Former Smoker     Packs/day: 0.50     Quit date: 12/10/2010     Smokeless tobacco: Never Used      Comment: quit dec 2010     Alcohol use No     Drug use: No     Sexual activity: No     Other Topics Concern     Not on file     Social History Narrative       Review of Systems     See  "HPI      Objective:         /80 (Patient Site: Right Arm, Patient Position: Sitting, Cuff Size: Adult Large)  Pulse (!) 58  Temp 98  F (36.7  C) (Oral)   Ht 5' 2\" (1.575 m)  Wt 160 lb 3 oz (72.7 kg)  SpO2 97%  BMI 29.3 kg/m2      Physical Exam:  General Appearance: Alert, cooperative, no distress, appears stated age  Head: Normocephalic, without obvious abnormality, atraumatic  Eyes: PERRL, conjunctiva/corneas clear, EOM's intact  Ears: Normal TM's and external ear canals, both ears  Nose: Nares normal, septum midline,mucosa normal, no drainage  Throat: Lips, mucosa, and tongue normal; teeth and gums normal  Neck: Supple, symmetrical, trachea midline, no adenopathy;  thyroid: not enlarged, symmetric, no tenderness/mass/nodules;   Back: Symmetric, no curvature, ROM normal  Lungs: Clear to auscultation bilaterally, respirations unlabored  Breasts: post-surgical changes right breast, no other abnormalities, left breast implant present  Heart: Regular rate and rhythm, S1 and S2 normal, no murmur, rub, or gallop, Abdomen: Soft, non-tender, bowel sounds active all four quadrants,  no masses, no organomegaly  Pelvic: narrowed vaginal introitus, unable to do speculum exam, bimanual exam normal  Extremities: Extremities normal, atraumatic, no cyanosis or edema  Skin: Skin color, texture, turgor normal, no rashes or lesions  Lymph nodes: Cervical, supraclavicular, and axillary nodes normal  Neurologic: Normal        "

## 2021-06-23 NOTE — TELEPHONE ENCOUNTER
Patient called today requesting to talk to Dr. Truong.  She said she has been having hot flashes and OBgyn recommended hormone replacement.  She would like Dr. Truong opinion on this due to her Hx of left breast CA. I told her I would pass her message along to Dr. Truong and call her back. She asked for a call back to [phone]. Message sent to Dr. Truong/LUZ MARINA Tsai RN

## 2021-06-23 NOTE — TELEPHONE ENCOUNTER
Patient calls back again stating that she has been recommended to start a pellet hormone therapy called Jose and she wanted to check with Dr Truong prior to starting this with her history of breast cancer.  Will send this message to Dr Truong and get back to patient once I have an answer.    Smitha Cardona RN

## 2021-06-24 NOTE — TELEPHONE ENCOUNTER
Patient called and left message wondering if Dr. Truong had an answer for the hormone therapy.  She said she has an appointment on Tues and will need to cancel appt if she doesn't have answer yet from Dr. Truong.  Message sent to Dr. Truong. Called patient to acknowledge her messages were received and that we have a message to Dr. Truong/LUZ MARINA Tsai RN

## 2021-06-24 NOTE — TELEPHONE ENCOUNTER
"Called patient with Dr. Truong' message \"I spent some time researching online and I cannot find out what is in these pellets.  If it is an estrogen-like compounds she should be cautious about it.      Can you find out why she is wanting to use them?    Also, where she have to go to get them put in.\"    I called patient...she said that SottoPelle is a plant based estrogen and testosterone pellet that's placed underneath her skin every 3 months.      I told her Dr. Truong said to use caution in using estrogen compound products due to her breast CA history (ER/MS+/HER2-).  During our conversation, patient decided she is not going to pursue the SottoPelle and is cancelling her appt today.    She did say she wanted to try SottoPelle because she has had increased hot flashes during the day and ~0300 every night, she wakes up hot and sweaty.  She is wondering if Dr. Truong has any recommendations to help with hot flashes/night sweats. I told her I would discuss and call her back with any recommendations.  Patient verbalized understanding and thanked me for calling.  Message to Dr. Truong.    LUZ MARINA Tsai RN   "

## 2021-06-24 NOTE — TELEPHONE ENCOUNTER
Called and gave patient the information regarding effexor.   She would like to try the medication.   Can you prescribe? Any other precautionary issues?  Savanna Price RN

## 2021-06-24 NOTE — TELEPHONE ENCOUNTER
"Patient calls clinic indicating that she has read over the potential side effects of Effexor and is no longer interested in trying it. She indicates she will just \"tough it out,\" in regards to her hot flashes.     She did not want any further suggestions at this time. She will follow up as indicated.   "

## 2021-06-26 ENCOUNTER — HEALTH MAINTENANCE LETTER (OUTPATIENT)
Age: 66
End: 2021-06-26

## 2021-07-03 NOTE — ADDENDUM NOTE
Addendum Note by Ruthie Hutson MD at 7/10/2019  1:40 PM     Author: Ruthie Hutson MD Service: -- Author Type: Physician    Filed: 7/10/2019  2:16 PM Encounter Date: 7/10/2019 Status: Signed    : Ruthie Hutson MD (Physician)    Addended by: RUTHIE HUTSON on: 7/10/2019 02:16 PM        Modules accepted: Orders

## 2021-07-13 ENCOUNTER — RECORDS - HEALTHEAST (OUTPATIENT)
Dept: ADMINISTRATIVE | Facility: CLINIC | Age: 66
End: 2021-07-13

## 2021-07-21 ENCOUNTER — RECORDS - HEALTHEAST (OUTPATIENT)
Dept: ADMINISTRATIVE | Facility: CLINIC | Age: 66
End: 2021-07-21

## 2021-07-23 ENCOUNTER — RECORDS - HEALTHEAST (OUTPATIENT)
Dept: ONCOLOGY | Facility: HOSPITAL | Age: 66
End: 2021-07-23

## 2021-07-23 DIAGNOSIS — Z12.31 OTHER SCREENING MAMMOGRAM: ICD-10-CM

## 2021-09-01 ENCOUNTER — OFFICE VISIT (OUTPATIENT)
Dept: FAMILY MEDICINE | Facility: CLINIC | Age: 66
End: 2021-09-01
Payer: COMMERCIAL

## 2021-09-01 VITALS
WEIGHT: 167.3 LBS | SYSTOLIC BLOOD PRESSURE: 120 MMHG | OXYGEN SATURATION: 99 % | HEART RATE: 67 BPM | BODY MASS INDEX: 30.35 KG/M2 | DIASTOLIC BLOOD PRESSURE: 70 MMHG

## 2021-09-01 DIAGNOSIS — N63.10 LUMP OF RIGHT BREAST: Primary | ICD-10-CM

## 2021-09-01 PROCEDURE — 99213 OFFICE O/P EST LOW 20 MIN: CPT | Performed by: FAMILY MEDICINE

## 2021-09-01 NOTE — PROGRESS NOTES
"    Assessment & Plan     Lump of right breast  We will obtain diagnostic mammogram and ultrasound of the right breast to further evaluate.  Further recommendations will be made once results are available.  - MA Diagnostic Digital Right  - US Breast Right Limited 1-3 Quadrants               BMI:   Estimated body mass index is 30.35 kg/m  as calculated from the following:    Height as of 3/1/21: 1.581 m (5' 2.25\").    Weight as of this encounter: 75.9 kg (167 lb 4.8 oz).           No follow-ups on file.    Ruthie Solis MD  [facility] KIRBY Dawson is a 66 year old who presents for the following health issues     HPI   She comes in today for concern of a lump in the right breast.  She has a history of left breast cancer and underwent left mastectomy.  She has an implant now in the left breast.  She completed treatment for breast cancer in 2017.  Last mammogram was in December 2020.  States that she noticed the lump about 4 days ago.  Noticed discomfort in the right breast and when she went to feel in the area of discomfort she noticed a small lump.  The lump has remained the same size over the past few days but she is noticing less discomfort.  She has not noticed any redness, warmth or changes in the overlying skin.  She is otherwise feeling well.  She has no other concerns or questions.  We reviewed and updated her medications and allergies.        Review of Systems   Constitutional, HEENT, cardiovascular, pulmonary, gi and gu systems are negative, except as otherwise noted.      Objective    /70 (BP Location: Left arm, Cuff Size: Adult Regular)   Pulse 67   Wt 75.9 kg (167 lb 4.8 oz)   SpO2 99%   BMI 30.35 kg/m    Body mass index is 30.35 kg/m .  Physical Exam   GENERAL: healthy, alert and no distress  BREAST: mass right breast approximately the size of a BB at approximately the 3 o'clock position near the right nipple                "

## 2021-09-10 ENCOUNTER — HOSPITAL ENCOUNTER (OUTPATIENT)
Dept: MAMMOGRAPHY | Facility: CLINIC | Age: 66
End: 2021-09-10
Attending: FAMILY MEDICINE
Payer: COMMERCIAL

## 2021-09-10 ENCOUNTER — HOSPITAL ENCOUNTER (OUTPATIENT)
Dept: ULTRASOUND IMAGING | Facility: CLINIC | Age: 66
End: 2021-09-10
Attending: FAMILY MEDICINE
Payer: COMMERCIAL

## 2021-09-10 DIAGNOSIS — N63.10 LUMP OF RIGHT BREAST: ICD-10-CM

## 2021-09-10 PROCEDURE — 76642 ULTRASOUND BREAST LIMITED: CPT | Mod: RT

## 2021-09-10 PROCEDURE — 77061 BREAST TOMOSYNTHESIS UNI: CPT | Mod: RT

## 2021-10-11 ENCOUNTER — TRANSFERRED RECORDS (OUTPATIENT)
Dept: HEALTH INFORMATION MANAGEMENT | Facility: CLINIC | Age: 66
End: 2021-10-11

## 2021-10-16 ENCOUNTER — HEALTH MAINTENANCE LETTER (OUTPATIENT)
Age: 66
End: 2021-10-16

## 2021-11-22 ENCOUNTER — TRANSFERRED RECORDS (OUTPATIENT)
Dept: HEALTH INFORMATION MANAGEMENT | Facility: CLINIC | Age: 66
End: 2021-11-22
Payer: COMMERCIAL

## 2022-01-07 ENCOUNTER — IMMUNIZATION (OUTPATIENT)
Dept: NURSING | Facility: CLINIC | Age: 67
End: 2022-01-07
Payer: COMMERCIAL

## 2022-01-07 PROCEDURE — 0054A COVID-19,PF,PFIZER (12+ YRS): CPT

## 2022-01-07 PROCEDURE — 91305 COVID-19,PF,PFIZER (12+ YRS): CPT

## 2022-03-14 ENCOUNTER — TRANSFERRED RECORDS (OUTPATIENT)
Dept: HEALTH INFORMATION MANAGEMENT | Facility: CLINIC | Age: 67
End: 2022-03-14
Payer: COMMERCIAL

## 2022-06-15 ENCOUNTER — TELEPHONE (OUTPATIENT)
Dept: FAMILY MEDICINE | Facility: CLINIC | Age: 67
End: 2022-06-15

## 2022-06-15 NOTE — TELEPHONE ENCOUNTER
Reason for Call:  Other prescription    Detailed comments: Received a call from patient regarding a prescription that was prescribed by Dr. De Paz at [facility], meloxicam 15 mg 1 tab daily to help relieve hip pain in addition to her cortisone injections.     Dr. De Paz is no longer comfortable providing additional refills her the meloxicam due to the possible risk of kidney damage.Was informed to talk with PCP to see if this is something patient can continue taking or if there are any alternatives that patient can try without the risk of kidney damage?     Please advise and inform patient. Pt will be due for a refill by the end of next week.     Phone Number Patient can be reached at: Cell number on file:    Telephone Information:   Mobile [phone]       Best Time: anytime     Can we leave a detailed message on this number? YES    Call taken on 6/15/2022 at 2:37 PM by Christen Kent

## 2022-06-15 NOTE — TELEPHONE ENCOUNTER
I would recommend an appointment to discuss.  Looks like she is due for annual wellness visit anyways.  Can get updated labs to check kidney function at that time.

## 2022-06-20 ENCOUNTER — TRANSFERRED RECORDS (OUTPATIENT)
Dept: HEALTH INFORMATION MANAGEMENT | Facility: CLINIC | Age: 67
End: 2022-06-20

## 2022-06-30 ASSESSMENT — ENCOUNTER SYMPTOMS
MYALGIAS: 0
PARESTHESIAS: 0
BREAST MASS: 0
DIARRHEA: 0
NERVOUS/ANXIOUS: 0
SORE THROAT: 0
SHORTNESS OF BREATH: 0
HEARTBURN: 0
ARTHRALGIAS: 0
JOINT SWELLING: 0
ABDOMINAL PAIN: 0
HEADACHES: 0
EYE PAIN: 0
WEAKNESS: 0
CHILLS: 0
NAUSEA: 0
DYSURIA: 0
DIZZINESS: 0
HEMATURIA: 0
COUGH: 0
FEVER: 0
CONSTIPATION: 0
FREQUENCY: 0
PALPITATIONS: 0
HEMATOCHEZIA: 0

## 2022-06-30 ASSESSMENT — ACTIVITIES OF DAILY LIVING (ADL): CURRENT_FUNCTION: NO ASSISTANCE NEEDED

## 2022-07-07 ENCOUNTER — OFFICE VISIT (OUTPATIENT)
Dept: FAMILY MEDICINE | Facility: CLINIC | Age: 67
End: 2022-07-07
Payer: COMMERCIAL

## 2022-07-07 VITALS
HEART RATE: 54 BPM | DIASTOLIC BLOOD PRESSURE: 70 MMHG | TEMPERATURE: 97.6 F | OXYGEN SATURATION: 98 % | SYSTOLIC BLOOD PRESSURE: 122 MMHG | HEIGHT: 62 IN | BODY MASS INDEX: 30.01 KG/M2 | WEIGHT: 163.1 LBS

## 2022-07-07 DIAGNOSIS — Z87.891 HISTORY OF TOBACCO USE: ICD-10-CM

## 2022-07-07 DIAGNOSIS — Z00.00 ENCOUNTER FOR PREVENTATIVE ADULT HEALTH CARE EXAMINATION: Primary | ICD-10-CM

## 2022-07-07 DIAGNOSIS — Z85.3 PERSONAL HISTORY OF MALIGNANT NEOPLASM OF BREAST: ICD-10-CM

## 2022-07-07 DIAGNOSIS — M25.552 HIP PAIN, LEFT: ICD-10-CM

## 2022-07-07 DIAGNOSIS — M89.9 DISORDER OF BONE AND CARTILAGE: ICD-10-CM

## 2022-07-07 DIAGNOSIS — K21.9 GASTROESOPHAGEAL REFLUX DISEASE WITHOUT ESOPHAGITIS: ICD-10-CM

## 2022-07-07 DIAGNOSIS — M94.9 DISORDER OF BONE AND CARTILAGE: ICD-10-CM

## 2022-07-07 DIAGNOSIS — M16.12 OSTEOARTHRITIS OF LEFT HIP, UNSPECIFIED OSTEOARTHRITIS TYPE: ICD-10-CM

## 2022-07-07 DIAGNOSIS — Z23 ENCOUNTER FOR IMMUNIZATION: ICD-10-CM

## 2022-07-07 DIAGNOSIS — Z23 HIGH PRIORITY FOR 2019-NCOV VACCINE: ICD-10-CM

## 2022-07-07 LAB
ANION GAP SERPL CALCULATED.3IONS-SCNC: 10 MMOL/L (ref 7–15)
BUN SERPL-MCNC: 13.4 MG/DL (ref 8–23)
CALCIUM SERPL-MCNC: 9.7 MG/DL (ref 8.8–10.2)
CHLORIDE SERPL-SCNC: 100 MMOL/L (ref 98–107)
CHOLEST SERPL-MCNC: 199 MG/DL
CREAT SERPL-MCNC: 0.69 MG/DL (ref 0.51–0.95)
DEPRECATED HCO3 PLAS-SCNC: 24 MMOL/L (ref 22–29)
GFR SERPL CREATININE-BSD FRML MDRD: >90 ML/MIN/1.73M2
GLUCOSE SERPL-MCNC: 74 MG/DL (ref 70–99)
HDLC SERPL-MCNC: 61 MG/DL
LDLC SERPL CALC-MCNC: 119 MG/DL
NONHDLC SERPL-MCNC: 138 MG/DL
POTASSIUM SERPL-SCNC: 4.1 MMOL/L (ref 3.4–5.3)
SODIUM SERPL-SCNC: 134 MMOL/L (ref 136–145)
TRIGL SERPL-MCNC: 97 MG/DL

## 2022-07-07 PROCEDURE — 80048 BASIC METABOLIC PNL TOTAL CA: CPT | Performed by: FAMILY MEDICINE

## 2022-07-07 PROCEDURE — 0054A COVID-19,PF,PFIZER (12+ YRS): CPT | Performed by: FAMILY MEDICINE

## 2022-07-07 PROCEDURE — 90471 IMMUNIZATION ADMIN: CPT | Performed by: FAMILY MEDICINE

## 2022-07-07 PROCEDURE — 36415 COLL VENOUS BLD VENIPUNCTURE: CPT | Performed by: FAMILY MEDICINE

## 2022-07-07 PROCEDURE — 99397 PER PM REEVAL EST PAT 65+ YR: CPT | Mod: 25 | Performed by: FAMILY MEDICINE

## 2022-07-07 PROCEDURE — 90677 PCV20 VACCINE IM: CPT | Performed by: FAMILY MEDICINE

## 2022-07-07 PROCEDURE — 91305 COVID-19,PF,PFIZER (12+ YRS): CPT | Performed by: FAMILY MEDICINE

## 2022-07-07 PROCEDURE — 80061 LIPID PANEL: CPT | Performed by: FAMILY MEDICINE

## 2022-07-07 RX ORDER — MELOXICAM 15 MG/1
TABLET ORAL
Qty: 90 TABLET | Refills: 1 | Status: SHIPPED | OUTPATIENT
Start: 2022-07-07 | End: 2023-09-26

## 2022-07-07 RX ORDER — MELOXICAM 15 MG/1
TABLET ORAL
COMMUNITY
Start: 2022-05-24 | End: 2022-07-07

## 2022-07-07 ASSESSMENT — ENCOUNTER SYMPTOMS
COUGH: 0
HEMATOCHEZIA: 0
ABDOMINAL PAIN: 0
JOINT SWELLING: 0
NAUSEA: 0
CHILLS: 0
PARESTHESIAS: 0
DIZZINESS: 0
SORE THROAT: 0
WEAKNESS: 0
PALPITATIONS: 0
SHORTNESS OF BREATH: 0
FEVER: 0
HEMATURIA: 0
ARTHRALGIAS: 0
DYSURIA: 0
MYALGIAS: 0
EYE PAIN: 0
HEARTBURN: 0
BREAST MASS: 0
CONSTIPATION: 0
NERVOUS/ANXIOUS: 0
DIARRHEA: 0
HEADACHES: 0
FREQUENCY: 0

## 2022-07-07 ASSESSMENT — ACTIVITIES OF DAILY LIVING (ADL): CURRENT_FUNCTION: NO ASSISTANCE NEEDED

## 2022-07-07 NOTE — PROGRESS NOTES
"SUBJECTIVE:   Samir Tomlin is a 67 year old female who presents for Preventive Visit.  Reviewed her health history.  She has history of left-sided breast cancer.  Previously treated with Arimidex.  Status post left mastectomy and reconstruction.  Arimidex was discontinued in 2016.  No longer follows with her oncologist.  Continues to get regular mammograms.  Last mammogram was in September 2021.  She reports no new breast concerns.  She has history of GERD and uses omeprazole.  She has osteopenia.  Takes calcium and vitamin D supplements.  Tries to walk for exercise.  Physical activity is limited by left hip pain and osteoarthritis of the hip.  She is scheduled for left hip replacement surgery in September.  She uses MiraLAX for constipation.  Also takes a multivitamin.  She is using meloxicam for her hip pain.  This was prescribed by her orthopedic specialist but her specialist has now advised her to get this medication from her PCP.  She would like refill of that today.    She gets regular vision and dental exams.  Tries to follow healthy diet.  Review of systems is assessed and is otherwise negative.  No other concerns or questions.    Patient has been advised of split billing requirements and indicates understanding: Yes  Are you in the first 12 months of your Medicare coverage?      Healthy Habits:     In general, how would you rate your overall health?  Good    Frequency of exercise:  2-3 days/week    Duration of exercise:  15-30 minutes    Do you usually eat at least 4 servings of fruit and vegetables a day, include whole grains    & fiber and avoid regularly eating high fat or \"junk\" foods?  Yes    Taking medications regularly:  Yes    Medication side effects:  Not applicable    Ability to successfully perform activities of daily living:  No assistance needed    Home Safety:  No safety concerns identified    Hearing Impairment:  No hearing concerns    In the past 6 months, have you been bothered by " leaking of urine?  No    In general, how would you rate your overall mental or emotional health?  Excellent      PHQ-2 Total Score: 0    Additional concerns today:  Yes    Do you feel safe in your environment? Yes    Have you ever done Advance Care Planning? (For example, a Health Directive, POLST, or a discussion with a medical provider or your loved ones about your wishes): No, advance care planning information given to patient to review.  Patient declined advance care planning discussion at this time.       Fall risk  Fallen 2 or more times in the past year?: No  Any fall with injury in the past year?: No    Cognitive Screening   1) Repeat 3 items (Leader, Season, Table)    2) Clock draw: NORMAL  3) 3 item recall: Recalls 3 objects  Results: 3 items recalled: COGNITIVE IMPAIRMENT LESS LIKELY    Mini-CogTM Copyright S Linda. Licensed by the author for use in [facility]; reprinted with permission (caron@[facility]). All rights reserved.      Do you have sleep apnea, excessive snoring or daytime drowsiness?: no    Reviewed and updated as needed this visit by clinical staff   Tobacco  Allergies  Meds                Reviewed and updated as needed this visit by Provider                   Social History     Tobacco Use     Smoking status: Former Smoker     Packs/day: 0.50     Years: 15.00     Pack years: 7.50     Quit date: 12/10/2010     Years since quittin.5     Smokeless tobacco: Never Used     Tobacco comment: quit dec 2010   Substance Use Topics     Alcohol use: No         Alcohol Use 2022   Prescreen: >3 drinks/day or >7 drinks/week? No               Current providers sharing in care for this patient include:   Patient Care Team:  Ruthie Solis MD as PCP - Willy Cerna MD as MD (Hematology & Oncology)  Ruthie Solis MD as Assigned PCP    The following health maintenance items are reviewed in Epic and correct as of today:  Health Maintenance Due   Topic Date Due     ANNUAL  REVIEW OF  ORDERS  Never done     ADVANCE CARE PLANNING  Never done     ZOSTER IMMUNIZATION (1 of 2) Never done     LUNG CANCER SCREENING  Never done     MEDICARE ANNUAL WELLNESS VISIT  04/23/2021     Pneumococcal Vaccine: 65+ Years (1 - PCV) Never done     COVID-19 Vaccine (4 - Booster for Pfizer series) 05/07/2022     Current Outpatient Medications   Medication Sig Dispense Refill     CALCIUM CARBONATE-VITAMIN D3 ORAL [CALCIUM CARBONATE-VITAMIN D3 ORAL] Take 1 tablet by mouth 2 (two) times a day. 500mg + 400iu vitamin d       meloxicam (MOBIC) 15 MG tablet TAKE ONE TABLET BY MOUTH EVERY DAY WITH FOOD 90 tablet 1     MULTIVITAMIN ORAL [MULTIVITAMIN ORAL] Take by mouth.       omeprazole (PRILOSEC) 20 MG DR capsule Take 20 mg by mouth daily       POLYETHYLENE GLYCOL 3350 ORAL [POLYETHYLENE GLYCOL 3350 ORAL] Take 17 g by mouth daily.        vitamin E 400 UNIT capsule [VITAMIN E 400 UNIT CAPSULE] Take 1,000 Units by mouth daily.            FHS-7:   Breast CA Risk Assessment (FHS-7) 9/10/2021 6/30/2022   Did any of your first-degree relatives have breast or ovarian cancer? No Yes   Did any of your relatives have bilateral breast cancer? No No   Did any man in your family have breast cancer? No No   Did any woman in your family have breast and ovarian cancer? No Yes   Did any woman in your family have breast cancer before age 50 y? No No   Do you have 2 or more relatives with breast and/or ovarian cancer? No Yes   Do you have 2 or more relatives with breast and/or bowel cancer? No Yes         Pertinent mammograms are reviewed under the imaging tab.    Review of Systems   Constitutional: Negative for chills and fever.   HENT: Negative for congestion, ear pain, hearing loss and sore throat.    Eyes: Negative for pain and visual disturbance.   Respiratory: Negative for cough and shortness of breath.    Cardiovascular: Negative for chest pain, palpitations and peripheral edema.   Gastrointestinal: Negative for abdominal  "pain, constipation, diarrhea, heartburn, hematochezia and nausea.   Breasts:  Negative for tenderness, breast mass and discharge.   Genitourinary: Negative for dysuria, frequency, genital sores, hematuria, pelvic pain, urgency, vaginal bleeding and vaginal discharge.   Musculoskeletal: Negative for arthralgias, joint swelling and myalgias.   Skin: Negative for rash.   Neurological: Negative for dizziness, weakness, headaches and paresthesias.   Psychiatric/Behavioral: Negative for mood changes. The patient is not nervous/anxious.          OBJECTIVE:   /70 (BP Location: Left arm, Cuff Size: Adult Regular)   Pulse 54   Temp 97.6  F (36.4  C) (Oral)   Ht 1.575 m (5' 2\")   Wt 74 kg (163 lb 1.6 oz)   SpO2 98%   BMI 29.83 kg/m   Estimated body mass index is 29.83 kg/m  as calculated from the following:    Height as of this encounter: 1.575 m (5' 2\").    Weight as of this encounter: 74 kg (163 lb 1.6 oz).  Physical Exam  GENERAL APPEARANCE: healthy, alert and no distress  EYES: Eyes grossly normal to inspection, PERRL and conjunctivae and sclerae normal  HENT: ear canals and TM's normal, nose and mouth without ulcers or lesions, oropharynx clear and oral mucous membranes moist  HENT: ear canals and TM's normal and nose and mouth without ulcers or lesions  RESP: lungs clear to auscultation - no rales, rhonchi or wheezes  BREAST: No breast masses, tenderness or nipple discharge in right breast.  Left breast implant present  CV: regular rate and rhythm, normal S1 S2, no S3 or S4, no murmur, click or rub, no peripheral edema and peripheral pulses strong  ABDOMEN: soft, nontender, no hepatosplenomegaly, no masses and bowel sounds normal  MS: no musculoskeletal defects are noted and gait is age appropriate without ataxia  SKIN: no suspicious lesions or rashes  NEURO: Normal strength and tone, sensory exam grossly normal, mentation intact and speech normal  PSYCH: mentation appears normal and affect " normal/bright        ASSESSMENT / PLAN:   Samir was seen today for annual wellness and imm/inj.    Diagnoses and all orders for this visit:    Encounter for preventative adult health care examination  -     Lipid panel reflex to direct LDL Fasting; Future  -     Basic metabolic panel  (Ca, Cl, CO2, Creat, Gluc, K, Na, BUN); Future  No longer needs Pap smears.  She will schedule mammogram in September.  Colonoscopy due in 2026 vaccines updated.  Check lipids, glucose today.  Encouraged regular exercise and healthy diet.  Encouraged regular vision and dental exams.  Follow-up in 1 year for annual physical.    Encounter for immunization  -     Pneumococcal 20 Valent Conjugate (PCV20)    High priority for 2019-nCoV vaccine  -     COVID-19,PF,PFIZER (12+ Yrs GRAY LABEL)    Hip pain, left  -     meloxicam (MOBIC) 15 MG tablet; TAKE ONE TABLET BY MOUTH EVERY DAY WITH FOOD  - Provided refill on meloxicam    Osteoarthritis of left hip, unspecified osteoarthritis type  -     meloxicam (MOBIC) 15 MG tablet; TAKE ONE TABLET BY MOUTH EVERY DAY WITH FOOD  - Provided refill of meloxicam.  Has left hip replacement surgery scheduled for September    Osteopenia  -     DX Hip/Pelvis/Spine; Future  - Encouraged regular weightbearing exercise and adequate calcium and vitamin D intake.  We will get updated bone density scan    Personal history of malignant neoplasm of breast  She will continue with yearly mammograms    Gastroesophageal reflux disease without esophagitis  She continues omeprazole    History of tobacco use  Quit smoking in 2010.  Had about 30-pack-year history of smoking.  Discussed low-dose chest CT for lung cancer screening.  She will consider this and check with insurance on coverage.  She will let me know if she would like me to place an order for screening.    Other orders  -     REVIEW OF HEALTH MAINTENANCE PROTOCOL ORDERS            COUNSELING:  Reviewed preventive health counseling, as reflected in patient  "instructions    Estimated body mass index is 29.83 kg/m  as calculated from the following:    Height as of this encounter: 1.575 m (5' 2\").    Weight as of this encounter: 74 kg (163 lb 1.6 oz).        She reports that she quit smoking about 11 years ago. She has a 7.50 pack-year smoking history. She has never used smokeless tobacco.      Appropriate preventive services were discussed with this patient, including applicable screening as appropriate for cardiovascular disease, diabetes, osteopenia/osteoporosis, and glaucoma.  As appropriate for age/gender, discussed screening for colorectal cancer, prostate cancer, breast cancer, and cervical cancer. Checklist reviewing preventive services available has been given to the patient.    Reviewed patients plan of care and provided an AVS. The Basic Care Plan (routine screening as documented in Health Maintenance) for Samir meets the Care Plan requirement. This Care Plan has been established and reviewed with the Patient.    Counseling Resources:  ATP IV Guidelines  Pooled Cohorts Equation Calculator  Breast Cancer Risk Calculator  Breast Cancer: Medication to Reduce Risk  FRAX Risk Assessment  ICSI Preventive Guidelines  Dietary Guidelines for Americans, 2010  USDA's MyPlate  ASA Prophylaxis  Lung CA Screening    Ruthie Solis MD  [facility]    Identified Health Risks:  "

## 2022-07-07 NOTE — PATIENT INSTRUCTIONS
I recommend the shingles vaccine. Best to get at the pharmacy    Consider chest CT for lung cancer screen. Check with insurance      Patient Education   Personalized Prevention Plan  You are due for the preventive services outlined below.  Your care team is available to assist you in scheduling these services.  If you have already completed any of these items, please share that information with your care team to update in your medical record.  Health Maintenance Due   Topic Date Due     Zoster (Shingles) Vaccine (1 of 2) Never done     LUNG CANCER SCREENING  Never done     Pneumococcal Vaccine (1 - PCV) Never done     COVID-19 Vaccine (4 - Booster for Pfizer series) 05/07/2022

## 2022-08-03 ENCOUNTER — ANCILLARY PROCEDURE (OUTPATIENT)
Dept: BONE DENSITY | Facility: CLINIC | Age: 67
End: 2022-08-03
Attending: FAMILY MEDICINE
Payer: COMMERCIAL

## 2022-08-03 DIAGNOSIS — M94.9 DISORDER OF BONE AND CARTILAGE: ICD-10-CM

## 2022-08-03 DIAGNOSIS — M89.9 DISORDER OF BONE AND CARTILAGE: ICD-10-CM

## 2022-08-03 PROCEDURE — 77080 DXA BONE DENSITY AXIAL: CPT | Mod: TC | Performed by: RADIOLOGY

## 2022-08-10 ENCOUNTER — HOSPITAL ENCOUNTER (OUTPATIENT)
Dept: MAMMOGRAPHY | Facility: CLINIC | Age: 67
Discharge: HOME | End: 2022-08-10
Attending: FAMILY MEDICINE | Admitting: FAMILY MEDICINE
Payer: COMMERCIAL

## 2022-08-10 DIAGNOSIS — Z12.31 VISIT FOR SCREENING MAMMOGRAM: ICD-10-CM

## 2022-08-10 PROCEDURE — 77067 SCR MAMMO BI INCL CAD: CPT | Mod: 52

## 2022-09-12 ENCOUNTER — OFFICE VISIT (OUTPATIENT)
Dept: FAMILY MEDICINE | Facility: CLINIC | Age: 67
End: 2022-09-12
Payer: COMMERCIAL

## 2022-09-12 VITALS
WEIGHT: 169.2 LBS | SYSTOLIC BLOOD PRESSURE: 118 MMHG | TEMPERATURE: 98.4 F | BODY MASS INDEX: 30.95 KG/M2 | HEART RATE: 54 BPM | OXYGEN SATURATION: 98 % | DIASTOLIC BLOOD PRESSURE: 80 MMHG

## 2022-09-12 DIAGNOSIS — M16.12 PRIMARY OSTEOARTHRITIS OF LEFT HIP: ICD-10-CM

## 2022-09-12 DIAGNOSIS — M94.9 DISORDER OF BONE AND CARTILAGE: ICD-10-CM

## 2022-09-12 DIAGNOSIS — M89.9 DISORDER OF BONE AND CARTILAGE: ICD-10-CM

## 2022-09-12 DIAGNOSIS — Z01.818 PRE-OP EXAM: Primary | ICD-10-CM

## 2022-09-12 DIAGNOSIS — K21.9 GASTROESOPHAGEAL REFLUX DISEASE WITHOUT ESOPHAGITIS: ICD-10-CM

## 2022-09-12 LAB
ERYTHROCYTE [DISTWIDTH] IN BLOOD BY AUTOMATED COUNT: 15 % (ref 10–15)
HCT VFR BLD AUTO: 41.6 % (ref 35–47)
HGB BLD-MCNC: 13.6 G/DL (ref 11.7–15.7)
HOLD SPECIMEN: NORMAL
MCH RBC QN AUTO: 28 PG (ref 26.5–33)
MCHC RBC AUTO-ENTMCNC: 32.7 G/DL (ref 31.5–36.5)
MCV RBC AUTO: 86 FL (ref 78–100)
PLATELET # BLD AUTO: 203 10E3/UL (ref 150–450)
RBC # BLD AUTO: 4.85 10E6/UL (ref 3.8–5.2)
WBC # BLD AUTO: 5.2 10E3/UL (ref 4–11)

## 2022-09-12 PROCEDURE — 93010 ELECTROCARDIOGRAM REPORT: CPT | Performed by: INTERNAL MEDICINE

## 2022-09-12 PROCEDURE — 90662 IIV NO PRSV INCREASED AG IM: CPT | Performed by: FAMILY MEDICINE

## 2022-09-12 PROCEDURE — 99214 OFFICE O/P EST MOD 30 MIN: CPT | Mod: 25 | Performed by: FAMILY MEDICINE

## 2022-09-12 PROCEDURE — 90471 IMMUNIZATION ADMIN: CPT | Performed by: FAMILY MEDICINE

## 2022-09-12 PROCEDURE — 36415 COLL VENOUS BLD VENIPUNCTURE: CPT | Performed by: FAMILY MEDICINE

## 2022-09-12 PROCEDURE — 85027 COMPLETE CBC AUTOMATED: CPT | Performed by: FAMILY MEDICINE

## 2022-09-12 PROCEDURE — 93005 ELECTROCARDIOGRAM TRACING: CPT | Performed by: FAMILY MEDICINE

## 2022-09-12 ASSESSMENT — PAIN SCALES - GENERAL: PAINLEVEL: MODERATE PAIN (4)

## 2022-09-12 NOTE — H&P (VIEW-ONLY)
[facility]  [address]  [facility] [address]  Phone: [phone]  Fax: [phone]  Primary Provider: Ruthie Hutson  Pre-op Performing Provider: RUTHIE HUTSON      PREOPERATIVE EVALUATION:  Today's date: 9/12/2022    Samir Tomlin is a 67 year old female who presents for a preoperative evaluation.    Surgical Information:  Surgery/Procedure: Left hip replacement  Surgery Location: [facility]  Surgeon: Dr. De Paz  Surgery Date: 9/26/22  Time of Surgery: tbd  Where patient plans to recover: At home with family  Fax number for surgical facility: Note does not need to be faxed, will be available electronically in Epic.    Type of Anesthesia Anticipated: to be determined    Assessment & Plan     The proposed surgical procedure is considered INTERMEDIATE risk.    Pre-op exam  No contraindications or significant risk factors to planned procedure.  She has COVID testing scheduled a few days prior to surgery.  Check CBC today.  She will hold NSAIDs and vitamin D-containing supplements prior to surgery  - EKG 12-lead, tracing only  - CBC with platelets    Primary osteoarthritis of left hip  Okay to proceed with procedure    Gastroesophageal reflux disease without esophagitis  Continue omeprazole.  No change in regimen prior to surgery    Osteopenia  She will hold vitamin D-containing supplements prior to surgery.  She will continue to get adequate calcium in her diet.  She will continue with regular weightbearing exercise                   RECOMMENDATION:  APPROVAL GIVEN to proceed with proposed procedure, without further diagnostic evaluation.                      Subjective     HPI related to upcoming procedure: She has severe osteoarthritis of the left hip causing pain and limited mobility.  Has failed conservative treatment and is now planning to proceed with left total hip replacement surgery    She has history of breast cancer.  She has GERD and uses omeprazole.  She has  history of osteopenia.  She takes calcium and vitamin D supplements.  Current medical conditions are stable.    Review of systems is assessed and is otherwise negative.  No other concerns or questions today.    Preop Questions 9/5/2022   1. Have you ever had a heart attack or stroke? No   2. Have you ever had surgery on your heart or blood vessels, such as a stent placement, a coronary artery bypass, or surgery on an artery in your head, neck, heart, or legs? No   3. Do you have chest pain with activity? No   4. Do you have a history of  heart failure? No   5. Do you currently have a cold, bronchitis or symptoms of other infection? No   6. Do you have a cough, shortness of breath, or wheezing? No   7. Do you or anyone in your family have previous history of blood clots? No   8. Do you or does anyone in your family have a serious bleeding problem such as prolonged bleeding following surgeries or cuts? No   9. Have you ever had problems with anemia or been told to take iron pills? No   10. Have you had any abnormal blood loss such as black, tarry or bloody stools, or abnormal vaginal bleeding? No   11. Have you ever had a blood transfusion? No   12. Are you willing to have a blood transfusion if it is medically needed before, during, or after your surgery? Yes   13. Have you or any of your relatives ever had problems with anesthesia? No   14. Do you have sleep apnea, excessive snoring or daytime drowsiness? No   15. Do you have any artifical heart valves or other implanted medical devices like a pacemaker, defibrillator, or continuous glucose monitor? No   16. Do you have artificial joints? No   17. Are you allergic to latex? No       Health Care Directive:  Patient does not have a Health Care Directive or Living Will: Discussed advance care planning with patient; information given to patient to review.    Preoperative Review of :   reviewed - no record of controlled substances prescribed.          Review of  Systems  Constitutional, neuro, ENT, endocrine, pulmonary, cardiac, gastrointestinal, genitourinary, musculoskeletal, integument and psychiatric systems are negative, except as otherwise noted.    Patient Active Problem List    Diagnosis Date Noted     Personal history of malignant neoplasm of breast 10/11/2019     Priority: Medium     GERD (gastroesophageal reflux disease) 10/11/2019     Priority: Medium     Hiatal hernia 08/04/2017     Priority: Medium     Osteopenia      Priority: Medium     Created by Conversion  Replacement Utility updated for latest IMO load         Generalized Anxiety Disorder      Priority: Medium     Created by Conversion         Acute Post-traumatic Stress Disorder      Priority: Medium     Created by Conversion         Esophageal Reflux      Priority: Medium     Created by Conversion          Past Medical History:   Diagnosis Date     Breast cancer (H) feb 14 2011    left breast     Generalized anxiety disorder      GERD (gastroesophageal reflux disease)      Posttraumatic stress disorder      Past Surgical History:   Procedure Laterality Date     BIOPSY BREAST Left      CHOLECYSTECTOMY  1/1998     COSMETIC SURGERY  10/2011    reconstruction of left breast after masectomy     MAMMOPLASTY REDUCTION Right 2011     MASTECTOMY Left 2011    implant left breast     Current Outpatient Medications   Medication Sig Dispense Refill     CALCIUM CARBONATE-VITAMIN D3 ORAL [CALCIUM CARBONATE-VITAMIN D3 ORAL] Take 1 tablet by mouth 2 (two) times a day. 500mg + 400iu vitamin d       meloxicam (MOBIC) 15 MG tablet TAKE ONE TABLET BY MOUTH EVERY DAY WITH FOOD 90 tablet 1     MULTIVITAMIN ORAL [MULTIVITAMIN ORAL] Take by mouth.       omeprazole (PRILOSEC) 20 MG DR capsule Take 20 mg by mouth daily       POLYETHYLENE GLYCOL 3350 ORAL [POLYETHYLENE GLYCOL 3350 ORAL] Take 17 g by mouth daily.        vitamin E 400 UNIT capsule [VITAMIN E 400 UNIT CAPSULE] Take 1,000 Units by mouth daily.          Allergies    Allergen Reactions     Erythromycin Base [Erythromycin] Unknown        Social History     Tobacco Use     Smoking status: Former Smoker     Packs/day: 0.50     Years: 15.00     Pack years: 7.50     Quit date: 12/10/2010     Years since quittin.7     Smokeless tobacco: Never Used     Tobacco comment: quit dec 2010   Substance Use Topics     Alcohol use: No       History   Drug Use No         Objective     /80 (BP Location: Right arm, Cuff Size: Adult Regular)   Pulse 54   Temp 98.4  F (36.9  C) (Oral)   Wt 76.7 kg (169 lb 3.2 oz)   SpO2 98%   BMI 30.95 kg/m      Physical Exam    GENERAL APPEARANCE: healthy, alert and no distress     EYES: EOMI, PERRL     HENT: ear canals and TM's normal     RESP: lungs clear to auscultation - no rales, rhonchi or wheezes     CV: regular rates and rhythm, normal S1 S2, no S3 or S4 and no murmur, click or rub     MS: extremities normal- no gross deformities noted, no evidence of inflammation in joints, FROM in all extremities.     SKIN: no suspicious lesions or rashes     NEURO: Normal strength and tone, sensory exam grossly normal, mentation intact and speech normal     PSYCH: mentation appears normal. and affect normal/bright     LYMPHATICS: No cervical adenopathy    Recent Labs   Lab Test 22  [id number]  [id number]   HGB  --  12.3   *  --    POTASSIUM 4.1  --    CR 0.69  --         Diagnostics:  Labs pending at this time.  Results will be reviewed when available.   EKG: sinus bradycardia, nonspecific ST-T changes    Revised Cardiac Risk Index (RCRI):  The patient has the following serious cardiovascular risks for perioperative complications:   - No serious cardiac risks = 0 points     RCRI Interpretation: 0 points: Class I (very low risk - 0.4% complication rate)           Signed Electronically by: Ruthie Solis MD  Copy of this evaluation report is provided to requesting physician.

## 2022-09-12 NOTE — PROGRESS NOTES
[facility]  [address]  [facility] [address]  Phone: [phone]  Fax: [phone]  Primary Provider: Ruthie Hutson  Pre-op Performing Provider: RUTHIE HUTSON      PREOPERATIVE EVALUATION:  Today's date: 9/12/2022    Samir Tomlin is a 67 year old female who presents for a preoperative evaluation.    Surgical Information:  Surgery/Procedure: Left hip replacement  Surgery Location: [facility]  Surgeon: Dr. De Paz  Surgery Date: 9/26/22  Time of Surgery: tbd  Where patient plans to recover: At home with family  Fax number for surgical facility: Note does not need to be faxed, will be available electronically in Epic.    Type of Anesthesia Anticipated: to be determined    Assessment & Plan     The proposed surgical procedure is considered INTERMEDIATE risk.    Pre-op exam  No contraindications or significant risk factors to planned procedure.  She has COVID testing scheduled a few days prior to surgery.  Check CBC today.  She will hold NSAIDs and vitamin D-containing supplements prior to surgery  - EKG 12-lead, tracing only  - CBC with platelets    Primary osteoarthritis of left hip  Okay to proceed with procedure    Gastroesophageal reflux disease without esophagitis  Continue omeprazole.  No change in regimen prior to surgery    Osteopenia  She will hold vitamin D-containing supplements prior to surgery.  She will continue to get adequate calcium in her diet.  She will continue with regular weightbearing exercise                   RECOMMENDATION:  APPROVAL GIVEN to proceed with proposed procedure, without further diagnostic evaluation.                      Subjective     HPI related to upcoming procedure: She has severe osteoarthritis of the left hip causing pain and limited mobility.  Has failed conservative treatment and is now planning to proceed with left total hip replacement surgery    She has history of breast cancer.  She has GERD and uses omeprazole.  She has  history of osteopenia.  She takes calcium and vitamin D supplements.  Current medical conditions are stable.    Review of systems is assessed and is otherwise negative.  No other concerns or questions today.    Preop Questions 9/5/2022   1. Have you ever had a heart attack or stroke? No   2. Have you ever had surgery on your heart or blood vessels, such as a stent placement, a coronary artery bypass, or surgery on an artery in your head, neck, heart, or legs? No   3. Do you have chest pain with activity? No   4. Do you have a history of  heart failure? No   5. Do you currently have a cold, bronchitis or symptoms of other infection? No   6. Do you have a cough, shortness of breath, or wheezing? No   7. Do you or anyone in your family have previous history of blood clots? No   8. Do you or does anyone in your family have a serious bleeding problem such as prolonged bleeding following surgeries or cuts? No   9. Have you ever had problems with anemia or been told to take iron pills? No   10. Have you had any abnormal blood loss such as black, tarry or bloody stools, or abnormal vaginal bleeding? No   11. Have you ever had a blood transfusion? No   12. Are you willing to have a blood transfusion if it is medically needed before, during, or after your surgery? Yes   13. Have you or any of your relatives ever had problems with anesthesia? No   14. Do you have sleep apnea, excessive snoring or daytime drowsiness? No   15. Do you have any artifical heart valves or other implanted medical devices like a pacemaker, defibrillator, or continuous glucose monitor? No   16. Do you have artificial joints? No   17. Are you allergic to latex? No       Health Care Directive:  Patient does not have a Health Care Directive or Living Will: Discussed advance care planning with patient; information given to patient to review.    Preoperative Review of :   reviewed - no record of controlled substances prescribed.          Review of  Systems  Constitutional, neuro, ENT, endocrine, pulmonary, cardiac, gastrointestinal, genitourinary, musculoskeletal, integument and psychiatric systems are negative, except as otherwise noted.    Patient Active Problem List    Diagnosis Date Noted     Personal history of malignant neoplasm of breast 10/11/2019     Priority: Medium     GERD (gastroesophageal reflux disease) 10/11/2019     Priority: Medium     Hiatal hernia 08/04/2017     Priority: Medium     Osteopenia      Priority: Medium     Created by Conversion  Replacement Utility updated for latest IMO load         Generalized Anxiety Disorder      Priority: Medium     Created by Conversion         Acute Post-traumatic Stress Disorder      Priority: Medium     Created by Conversion         Esophageal Reflux      Priority: Medium     Created by Conversion          Past Medical History:   Diagnosis Date     Breast cancer (H) feb 14 2011    left breast     Generalized anxiety disorder      GERD (gastroesophageal reflux disease)      Posttraumatic stress disorder      Past Surgical History:   Procedure Laterality Date     BIOPSY BREAST Left      CHOLECYSTECTOMY  1/1998     COSMETIC SURGERY  10/2011    reconstruction of left breast after masectomy     MAMMOPLASTY REDUCTION Right 2011     MASTECTOMY Left 2011    implant left breast     Current Outpatient Medications   Medication Sig Dispense Refill     CALCIUM CARBONATE-VITAMIN D3 ORAL [CALCIUM CARBONATE-VITAMIN D3 ORAL] Take 1 tablet by mouth 2 (two) times a day. 500mg + 400iu vitamin d       meloxicam (MOBIC) 15 MG tablet TAKE ONE TABLET BY MOUTH EVERY DAY WITH FOOD 90 tablet 1     MULTIVITAMIN ORAL [MULTIVITAMIN ORAL] Take by mouth.       omeprazole (PRILOSEC) 20 MG DR capsule Take 20 mg by mouth daily       POLYETHYLENE GLYCOL 3350 ORAL [POLYETHYLENE GLYCOL 3350 ORAL] Take 17 g by mouth daily.        vitamin E 400 UNIT capsule [VITAMIN E 400 UNIT CAPSULE] Take 1,000 Units by mouth daily.          Allergies    Allergen Reactions     Erythromycin Base [Erythromycin] Unknown        Social History     Tobacco Use     Smoking status: Former Smoker     Packs/day: 0.50     Years: 15.00     Pack years: 7.50     Quit date: 12/10/2010     Years since quittin.7     Smokeless tobacco: Never Used     Tobacco comment: quit dec 2010   Substance Use Topics     Alcohol use: No       History   Drug Use No         Objective     /80 (BP Location: Right arm, Cuff Size: Adult Regular)   Pulse 54   Temp 98.4  F (36.9  C) (Oral)   Wt 76.7 kg (169 lb 3.2 oz)   SpO2 98%   BMI 30.95 kg/m      Physical Exam    GENERAL APPEARANCE: healthy, alert and no distress     EYES: EOMI, PERRL     HENT: ear canals and TM's normal     RESP: lungs clear to auscultation - no rales, rhonchi or wheezes     CV: regular rates and rhythm, normal S1 S2, no S3 or S4 and no murmur, click or rub     MS: extremities normal- no gross deformities noted, no evidence of inflammation in joints, FROM in all extremities.     SKIN: no suspicious lesions or rashes     NEURO: Normal strength and tone, sensory exam grossly normal, mentation intact and speech normal     PSYCH: mentation appears normal. and affect normal/bright     LYMPHATICS: No cervical adenopathy    Recent Labs   Lab Test 22  0906 21  0747   HGB  --  12.3   *  --    POTASSIUM 4.1  --    CR 0.69  --         Diagnostics:  Labs pending at this time.  Results will be reviewed when available.   EKG: sinus bradycardia, nonspecific ST-T changes    Revised Cardiac Risk Index (RCRI):  The patient has the following serious cardiovascular risks for perioperative complications:   - No serious cardiac risks = 0 points     RCRI Interpretation: 0 points: Class I (very low risk - 0.4% complication rate)           Signed Electronically by: Ruthie Solis MD  Copy of this evaluation report is provided to requesting physician.

## 2022-09-13 LAB
ATRIAL RATE - MUSE: 50 BPM
DIASTOLIC BLOOD PRESSURE - MUSE: NORMAL MMHG
INTERPRETATION ECG - MUSE: NORMAL
P AXIS - MUSE: 76 DEGREES
PR INTERVAL - MUSE: 156 MS
QRS DURATION - MUSE: 88 MS
QT - MUSE: 416 MS
QTC - MUSE: 379 MS
R AXIS - MUSE: 53 DEGREES
SYSTOLIC BLOOD PRESSURE - MUSE: NORMAL MMHG
T AXIS - MUSE: 39 DEGREES
VENTRICULAR RATE- MUSE: 50 BPM

## 2022-09-20 RX ORDER — ACETAMINOPHEN 500 MG
500-1000 TABLET ORAL EVERY 6 HOURS PRN
Status: ON HOLD | COMMUNITY
End: 2022-09-27

## 2022-09-20 NOTE — PROVIDER NOTIFICATION
Planning to discharge home on POD 1 in the morning with her brother, Hever. She plans to stay at her brother's home for 2 weeks after surgery.       09/20/22 0929   Discharge Planning   Patient/Family Anticipates Transition to family members' home  (will stay at brother's home)   Concerns to be Addressed all concerns addressed in this encounter   Living Arrangements   People in Home alone   Type of Residence Private Residence   Is your private residence a single family home or apartment? Single family home   Number of Stairs, Within Home, Primary greater than 10 stairs  (16 stairs at her home and 7 stairs at her brother's home)   Stair Railings, Within Home, Primary railings safe and in good condition   Once home, are you able to live on one level? Yes   Which level? Main Level   Bathroom Shower/Tub Tub/Shower unit   Equipment Currently Used at Home shower chair;cane, straight;dressing device  (Will be borrowing a walker from her brother)   Support System   Support Systems Family Members  (brother, Hever Tomlin)   Do you have someone available to stay with you one or two nights once you are home? Yes   Education   Patient attended total joint pre-op class/received pre-op teaching  email/phone call

## 2022-09-22 ENCOUNTER — LAB (OUTPATIENT)
Dept: LAB | Facility: CLINIC | Age: 67
End: 2022-09-22
Payer: COMMERCIAL

## 2022-09-22 DIAGNOSIS — Z20.822 ENCOUNTER FOR LABORATORY TESTING FOR COVID-19 VIRUS: ICD-10-CM

## 2022-09-22 LAB — SARS-COV-2 RNA RESP QL NAA+PROBE: NEGATIVE

## 2022-09-22 PROCEDURE — U0003 INFECTIOUS AGENT DETECTION BY NUCLEIC ACID (DNA OR RNA); SEVERE ACUTE RESPIRATORY SYNDROME CORONAVIRUS 2 (SARS-COV-2) (CORONAVIRUS DISEASE [COVID-19]), AMPLIFIED PROBE TECHNIQUE, MAKING USE OF HIGH THROUGHPUT TECHNOLOGIES AS DESCRIBED BY CMS-2020-01-R: HCPCS

## 2022-09-22 PROCEDURE — U0005 INFEC AGEN DETEC AMPLI PROBE: HCPCS

## 2022-09-23 ENCOUNTER — TELEPHONE (OUTPATIENT)
Dept: FAMILY MEDICINE | Facility: CLINIC | Age: 67
End: 2022-09-23

## 2022-09-23 DIAGNOSIS — M16.12 PRIMARY OSTEOARTHRITIS OF LEFT HIP: Primary | ICD-10-CM

## 2022-09-23 DIAGNOSIS — M25.551 HIP PAIN, RIGHT: ICD-10-CM

## 2022-09-23 DIAGNOSIS — M25.552 HIP PAIN, LEFT: ICD-10-CM

## 2022-09-23 RX ORDER — TRAMADOL HYDROCHLORIDE 50 MG/1
50 TABLET ORAL EVERY 6 HOURS PRN
Qty: 15 TABLET | Refills: 0 | Status: ON HOLD | OUTPATIENT
Start: 2022-09-23 | End: 2022-09-27

## 2022-09-23 NOTE — TELEPHONE ENCOUNTER
Patient called this morning reporting constant, radiating pain in her left hip.     Rating of Pain: 7-8/10  Treatment so far consists of: Acetaminophen     Patient is requesting a medication for pain.     If appropriate, please send a prescription to [facility] listed in her chart.     OK to leave a detailed message on her mobile number.

## 2022-09-23 NOTE — TELEPHONE ENCOUNTER
Reviewed . Pt is having hip replacement surgery next week and unable to take NSAIDS (holding pre-operatively)    Rx for tramadol sent to pharmacy. Please notify pt

## 2022-09-24 ENCOUNTER — ANESTHESIA EVENT (OUTPATIENT)
Dept: SURGERY | Facility: CLINIC | Age: 67
End: 2022-09-24
Payer: COMMERCIAL

## 2022-09-26 ENCOUNTER — APPOINTMENT (OUTPATIENT)
Dept: PHYSICAL THERAPY | Facility: CLINIC | Age: 67
End: 2022-09-26
Attending: ORTHOPAEDIC SURGERY
Payer: COMMERCIAL

## 2022-09-26 ENCOUNTER — HOSPITAL ENCOUNTER (OUTPATIENT)
Facility: CLINIC | Age: 67
Discharge: HOME | End: 2022-09-27
Attending: ORTHOPAEDIC SURGERY | Admitting: ORTHOPAEDIC SURGERY
Payer: COMMERCIAL

## 2022-09-26 ENCOUNTER — APPOINTMENT (OUTPATIENT)
Dept: RADIOLOGY | Facility: CLINIC | Age: 67
End: 2022-09-26
Attending: PHYSICIAN ASSISTANT
Payer: COMMERCIAL

## 2022-09-26 ENCOUNTER — APPOINTMENT (OUTPATIENT)
Dept: RADIOLOGY | Facility: CLINIC | Age: 67
End: 2022-09-26
Attending: ORTHOPAEDIC SURGERY
Payer: COMMERCIAL

## 2022-09-26 ENCOUNTER — ANESTHESIA (OUTPATIENT)
Dept: SURGERY | Facility: CLINIC | Age: 67
End: 2022-09-26
Payer: COMMERCIAL

## 2022-09-26 DIAGNOSIS — Z96.642 STATUS POST TOTAL REPLACEMENT OF LEFT HIP: Primary | ICD-10-CM

## 2022-09-26 LAB
CREAT SERPL-MCNC: 0.79 MG/DL (ref 0.6–1.1)
ERYTHROCYTE [DISTWIDTH] IN BLOOD BY AUTOMATED COUNT: 15.1 % (ref 10–15)
GFR SERPL CREATININE-BSD FRML MDRD: 82 ML/MIN/1.73M2
HCT VFR BLD AUTO: 40.3 % (ref 35–47)
HGB BLD-MCNC: 12.9 G/DL (ref 11.7–15.7)
INR PPP: 1 (ref 0.85–1.15)
MCH RBC QN AUTO: 27.6 PG (ref 26.5–33)
MCHC RBC AUTO-ENTMCNC: 32 G/DL (ref 31.5–36.5)
MCV RBC AUTO: 86 FL (ref 78–100)
PLATELET # BLD AUTO: 178 10E3/UL (ref 150–450)
POTASSIUM BLD-SCNC: 3.9 MMOL/L (ref 3.5–5)
RBC # BLD AUTO: 4.68 10E6/UL (ref 3.8–5.2)
WBC # BLD AUTO: 5 10E3/UL (ref 4–11)

## 2022-09-26 PROCEDURE — 278N000051 HC OR IMPLANT GENERAL: Performed by: ORTHOPAEDIC SURGERY

## 2022-09-26 PROCEDURE — 370N000017 HC ANESTHESIA TECHNICAL FEE, PER MIN: Performed by: ORTHOPAEDIC SURGERY

## 2022-09-26 PROCEDURE — 250N000011 HC RX IP 250 OP 636: Performed by: ANESTHESIOLOGY

## 2022-09-26 PROCEDURE — 99214 OFFICE O/P EST MOD 30 MIN: CPT | Performed by: FAMILY MEDICINE

## 2022-09-26 PROCEDURE — 84132 ASSAY OF SERUM POTASSIUM: CPT | Performed by: PHYSICIAN ASSISTANT

## 2022-09-26 PROCEDURE — 82565 ASSAY OF CREATININE: CPT | Performed by: PHYSICIAN ASSISTANT

## 2022-09-26 PROCEDURE — 999N000141 HC STATISTIC PRE-PROCEDURE NURSING ASSESSMENT: Performed by: ORTHOPAEDIC SURGERY

## 2022-09-26 PROCEDURE — 85610 PROTHROMBIN TIME: CPT | Performed by: PHYSICIAN ASSISTANT

## 2022-09-26 PROCEDURE — 97110 THERAPEUTIC EXERCISES: CPT | Mod: GP

## 2022-09-26 PROCEDURE — 258N000003 HC RX IP 258 OP 636

## 2022-09-26 PROCEDURE — 710N000010 HC RECOVERY PHASE 1, LEVEL 2, PER MIN: Performed by: ORTHOPAEDIC SURGERY

## 2022-09-26 PROCEDURE — 250N000011 HC RX IP 250 OP 636: Performed by: ORTHOPAEDIC SURGERY

## 2022-09-26 PROCEDURE — 97530 THERAPEUTIC ACTIVITIES: CPT | Mod: GP

## 2022-09-26 PROCEDURE — 250N000013 HC RX MED GY IP 250 OP 250 PS 637

## 2022-09-26 PROCEDURE — 258N000003 HC RX IP 258 OP 636: Performed by: ORTHOPAEDIC SURGERY

## 2022-09-26 PROCEDURE — 36415 COLL VENOUS BLD VENIPUNCTURE: CPT | Performed by: PHYSICIAN ASSISTANT

## 2022-09-26 PROCEDURE — 272N000001 HC OR GENERAL SUPPLY STERILE: Performed by: ORTHOPAEDIC SURGERY

## 2022-09-26 PROCEDURE — 999N000065 XR PELVIS PORT 1/2 VIEWS

## 2022-09-26 PROCEDURE — 250N000011 HC RX IP 250 OP 636: Performed by: NURSE ANESTHETIST, CERTIFIED REGISTERED

## 2022-09-26 PROCEDURE — 85027 COMPLETE CBC AUTOMATED: CPT | Performed by: PHYSICIAN ASSISTANT

## 2022-09-26 PROCEDURE — 250N000009 HC RX 250: Performed by: PHYSICIAN ASSISTANT

## 2022-09-26 PROCEDURE — 97162 PT EVAL MOD COMPLEX 30 MIN: CPT | Mod: GP

## 2022-09-26 PROCEDURE — C1776 JOINT DEVICE (IMPLANTABLE): HCPCS | Performed by: ORTHOPAEDIC SURGERY

## 2022-09-26 PROCEDURE — 999N000063 XR HIP PORT LEFT 1 VIEW

## 2022-09-26 PROCEDURE — 250N000013 HC RX MED GY IP 250 OP 250 PS 637: Performed by: ORTHOPAEDIC SURGERY

## 2022-09-26 PROCEDURE — 360N000077 HC SURGERY LEVEL 4, PER MIN: Performed by: ORTHOPAEDIC SURGERY

## 2022-09-26 PROCEDURE — 250N000011 HC RX IP 250 OP 636: Performed by: PHYSICIAN ASSISTANT

## 2022-09-26 PROCEDURE — 250N000009 HC RX 250: Performed by: NURSE ANESTHETIST, CERTIFIED REGISTERED

## 2022-09-26 PROCEDURE — 250N000013 HC RX MED GY IP 250 OP 250 PS 637: Performed by: PHYSICIAN ASSISTANT

## 2022-09-26 PROCEDURE — 258N000003 HC RX IP 258 OP 636: Performed by: NURSE ANESTHETIST, CERTIFIED REGISTERED

## 2022-09-26 PROCEDURE — 97116 GAIT TRAINING THERAPY: CPT | Mod: GP

## 2022-09-26 DEVICE — ACETABULAR PIN: Type: IMPLANTABLE DEVICE | Site: HIP | Status: FUNCTIONAL

## 2022-09-26 DEVICE — TRIDENT X3 10 DEGREE POLYETHYLENE INSERT
Type: IMPLANTABLE DEVICE | Site: HIP | Status: FUNCTIONAL
Brand: TRIDENT X3 INSERT

## 2022-09-26 DEVICE — TRIDENT II PSL CLUSTERHOLE HA ACETABULAR SHELL 50D
Type: IMPLANTABLE DEVICE | Site: HIP | Status: FUNCTIONAL
Brand: TRIDENT II

## 2022-09-26 DEVICE — CERAMIC V40 FEMORAL HEAD
Type: IMPLANTABLE DEVICE | Site: HIP | Status: FUNCTIONAL
Brand: BIOLOX

## 2022-09-26 DEVICE — 127 DEGREE NECK ANGLE HIP STEM
Type: IMPLANTABLE DEVICE | Site: HIP | Status: FUNCTIONAL
Brand: ACCOLADE

## 2022-09-26 DEVICE — 6.5MM LOW PROFILE HEX SCREW 25MM
Type: IMPLANTABLE DEVICE | Site: HIP | Status: FUNCTIONAL
Brand: TRIDENT II

## 2022-09-26 RX ORDER — TRANEXAMIC ACID 650 MG/1
1950 TABLET ORAL ONCE
Status: COMPLETED | OUTPATIENT
Start: 2022-09-26 | End: 2022-09-26

## 2022-09-26 RX ORDER — MEPERIDINE HYDROCHLORIDE 25 MG/ML
12.5 INJECTION INTRAMUSCULAR; INTRAVENOUS; SUBCUTANEOUS
Status: DISCONTINUED | OUTPATIENT
Start: 2022-09-26 | End: 2022-09-26 | Stop reason: HOSPADM

## 2022-09-26 RX ORDER — ACETAMINOPHEN 325 MG/1
975 TABLET ORAL ONCE
Status: COMPLETED | OUTPATIENT
Start: 2022-09-26 | End: 2022-09-26

## 2022-09-26 RX ORDER — HYDROMORPHONE HCL IN WATER/PF 6 MG/30 ML
0.4 PATIENT CONTROLLED ANALGESIA SYRINGE INTRAVENOUS
Status: DISCONTINUED | OUTPATIENT
Start: 2022-09-26 | End: 2022-09-27 | Stop reason: HOSPADM

## 2022-09-26 RX ORDER — OXYCODONE HYDROCHLORIDE 5 MG/1
5 TABLET ORAL EVERY 4 HOURS PRN
Status: DISCONTINUED | OUTPATIENT
Start: 2022-09-26 | End: 2022-09-26 | Stop reason: HOSPADM

## 2022-09-26 RX ORDER — NALOXONE HYDROCHLORIDE 0.4 MG/ML
0.2 INJECTION, SOLUTION INTRAMUSCULAR; INTRAVENOUS; SUBCUTANEOUS
Status: DISCONTINUED | OUTPATIENT
Start: 2022-09-26 | End: 2022-09-27 | Stop reason: HOSPADM

## 2022-09-26 RX ORDER — ONDANSETRON 2 MG/ML
4 INJECTION INTRAMUSCULAR; INTRAVENOUS EVERY 30 MIN PRN
Status: DISCONTINUED | OUTPATIENT
Start: 2022-09-26 | End: 2022-09-26 | Stop reason: HOSPADM

## 2022-09-26 RX ORDER — HYDROMORPHONE HCL IN WATER/PF 6 MG/30 ML
0.2 PATIENT CONTROLLED ANALGESIA SYRINGE INTRAVENOUS
Status: DISCONTINUED | OUTPATIENT
Start: 2022-09-26 | End: 2022-09-27 | Stop reason: HOSPADM

## 2022-09-26 RX ORDER — AMOXICILLIN 250 MG
1-2 CAPSULE ORAL 2 TIMES DAILY
Qty: 30 TABLET | Refills: 0 | Status: SHIPPED | OUTPATIENT
Start: 2022-09-26 | End: 2023-09-26

## 2022-09-26 RX ORDER — CEFAZOLIN SODIUM/WATER 2 G/20 ML
2 SYRINGE (ML) INTRAVENOUS
Status: COMPLETED | OUTPATIENT
Start: 2022-09-26 | End: 2022-09-26

## 2022-09-26 RX ORDER — CEFAZOLIN SODIUM 1 G/3ML
1 INJECTION, POWDER, FOR SOLUTION INTRAMUSCULAR; INTRAVENOUS EVERY 8 HOURS
Status: COMPLETED | OUTPATIENT
Start: 2022-09-26 | End: 2022-09-26

## 2022-09-26 RX ORDER — BISACODYL 10 MG
10 SUPPOSITORY, RECTAL RECTAL DAILY PRN
Status: DISCONTINUED | OUTPATIENT
Start: 2022-09-26 | End: 2022-09-27 | Stop reason: HOSPADM

## 2022-09-26 RX ORDER — PROPOFOL 10 MG/ML
INJECTION, EMULSION INTRAVENOUS PRN
Status: DISCONTINUED | OUTPATIENT
Start: 2022-09-26 | End: 2022-09-26

## 2022-09-26 RX ORDER — CEFAZOLIN SODIUM/WATER 2 G/20 ML
2 SYRINGE (ML) INTRAVENOUS SEE ADMIN INSTRUCTIONS
Status: DISCONTINUED | OUTPATIENT
Start: 2022-09-26 | End: 2022-09-26 | Stop reason: HOSPADM

## 2022-09-26 RX ORDER — NALOXONE HYDROCHLORIDE 0.4 MG/ML
0.4 INJECTION, SOLUTION INTRAMUSCULAR; INTRAVENOUS; SUBCUTANEOUS
Status: DISCONTINUED | OUTPATIENT
Start: 2022-09-26 | End: 2022-09-27 | Stop reason: HOSPADM

## 2022-09-26 RX ORDER — PROCHLORPERAZINE MALEATE 5 MG
5 TABLET ORAL EVERY 6 HOURS PRN
Status: DISCONTINUED | OUTPATIENT
Start: 2022-09-26 | End: 2022-09-27 | Stop reason: HOSPADM

## 2022-09-26 RX ORDER — ONDANSETRON 4 MG/1
4 TABLET, ORALLY DISINTEGRATING ORAL EVERY 6 HOURS PRN
Status: DISCONTINUED | OUTPATIENT
Start: 2022-09-26 | End: 2022-09-27 | Stop reason: HOSPADM

## 2022-09-26 RX ORDER — ONDANSETRON 4 MG/1
4 TABLET, ORALLY DISINTEGRATING ORAL EVERY 30 MIN PRN
Status: DISCONTINUED | OUTPATIENT
Start: 2022-09-26 | End: 2022-09-26 | Stop reason: HOSPADM

## 2022-09-26 RX ORDER — OXYCODONE HYDROCHLORIDE 5 MG/1
5-10 TABLET ORAL EVERY 4 HOURS PRN
Qty: 25 TABLET | Refills: 0 | Status: SHIPPED | OUTPATIENT
Start: 2022-09-26 | End: 2023-09-26

## 2022-09-26 RX ORDER — SODIUM CHLORIDE, SODIUM LACTATE, POTASSIUM CHLORIDE, CALCIUM CHLORIDE 600; 310; 30; 20 MG/100ML; MG/100ML; MG/100ML; MG/100ML
INJECTION, SOLUTION INTRAVENOUS CONTINUOUS
Status: DISCONTINUED | OUTPATIENT
Start: 2022-09-26 | End: 2022-09-26 | Stop reason: HOSPADM

## 2022-09-26 RX ORDER — FENTANYL CITRATE 50 UG/ML
25 INJECTION, SOLUTION INTRAMUSCULAR; INTRAVENOUS
Status: CANCELLED | OUTPATIENT
Start: 2022-09-26

## 2022-09-26 RX ORDER — FAMOTIDINE 20 MG/1
20 TABLET, FILM COATED ORAL 2 TIMES DAILY
Status: DISCONTINUED | OUTPATIENT
Start: 2022-09-26 | End: 2022-09-27 | Stop reason: HOSPADM

## 2022-09-26 RX ORDER — OXYCODONE HYDROCHLORIDE 5 MG/1
10 TABLET ORAL EVERY 4 HOURS PRN
Status: DISCONTINUED | OUTPATIENT
Start: 2022-09-26 | End: 2022-09-27 | Stop reason: HOSPADM

## 2022-09-26 RX ORDER — PANTOPRAZOLE SODIUM 20 MG/1
40 TABLET, DELAYED RELEASE ORAL DAILY
Status: DISCONTINUED | OUTPATIENT
Start: 2022-09-27 | End: 2022-09-27 | Stop reason: HOSPADM

## 2022-09-26 RX ORDER — ASPIRIN 325 MG
325 TABLET ORAL DAILY
Status: DISCONTINUED | OUTPATIENT
Start: 2022-09-26 | End: 2022-09-27 | Stop reason: HOSPADM

## 2022-09-26 RX ORDER — LIDOCAINE 40 MG/G
CREAM TOPICAL
Status: DISCONTINUED | OUTPATIENT
Start: 2022-09-26 | End: 2022-09-26 | Stop reason: HOSPADM

## 2022-09-26 RX ORDER — PROPOFOL 10 MG/ML
INJECTION, EMULSION INTRAVENOUS CONTINUOUS PRN
Status: DISCONTINUED | OUTPATIENT
Start: 2022-09-26 | End: 2022-09-26

## 2022-09-26 RX ORDER — ONDANSETRON 2 MG/ML
4 INJECTION INTRAMUSCULAR; INTRAVENOUS EVERY 6 HOURS PRN
Status: DISCONTINUED | OUTPATIENT
Start: 2022-09-26 | End: 2022-09-27 | Stop reason: HOSPADM

## 2022-09-26 RX ORDER — FENTANYL CITRATE 50 UG/ML
25 INJECTION, SOLUTION INTRAMUSCULAR; INTRAVENOUS EVERY 5 MIN PRN
Status: DISCONTINUED | OUTPATIENT
Start: 2022-09-26 | End: 2022-09-26 | Stop reason: HOSPADM

## 2022-09-26 RX ORDER — LIDOCAINE 40 MG/G
CREAM TOPICAL
Status: DISCONTINUED | OUTPATIENT
Start: 2022-09-26 | End: 2022-09-27 | Stop reason: HOSPADM

## 2022-09-26 RX ORDER — DIPHENHYDRAMINE HCL 12.5 MG/5ML
12.5 SOLUTION ORAL EVERY 6 HOURS PRN
Status: DISCONTINUED | OUTPATIENT
Start: 2022-09-26 | End: 2022-09-27 | Stop reason: HOSPADM

## 2022-09-26 RX ORDER — FENTANYL CITRATE 50 UG/ML
INJECTION, SOLUTION INTRAMUSCULAR; INTRAVENOUS PRN
Status: DISCONTINUED | OUTPATIENT
Start: 2022-09-26 | End: 2022-09-26

## 2022-09-26 RX ORDER — SODIUM CHLORIDE, SODIUM LACTATE, POTASSIUM CHLORIDE, CALCIUM CHLORIDE 600; 310; 30; 20 MG/100ML; MG/100ML; MG/100ML; MG/100ML
INJECTION, SOLUTION INTRAVENOUS CONTINUOUS
Status: DISCONTINUED | OUTPATIENT
Start: 2022-09-26 | End: 2022-09-27 | Stop reason: HOSPADM

## 2022-09-26 RX ORDER — MAGNESIUM SULFATE HEPTAHYDRATE 40 MG/ML
2 INJECTION, SOLUTION INTRAVENOUS
Status: DISCONTINUED | OUTPATIENT
Start: 2022-09-26 | End: 2022-09-26 | Stop reason: HOSPADM

## 2022-09-26 RX ORDER — LIDOCAINE HYDROCHLORIDE 10 MG/ML
INJECTION, SOLUTION INFILTRATION; PERINEURAL PRN
Status: DISCONTINUED | OUTPATIENT
Start: 2022-09-26 | End: 2022-09-26

## 2022-09-26 RX ORDER — KETOROLAC TROMETHAMINE 30 MG/ML
15 INJECTION, SOLUTION INTRAMUSCULAR; INTRAVENOUS EVERY 6 HOURS PRN
Status: DISCONTINUED | OUTPATIENT
Start: 2022-09-26 | End: 2022-09-26 | Stop reason: HOSPADM

## 2022-09-26 RX ORDER — POLYETHYLENE GLYCOL 3350 17 G/17G
17 POWDER, FOR SOLUTION ORAL DAILY
Status: DISCONTINUED | OUTPATIENT
Start: 2022-09-27 | End: 2022-09-27 | Stop reason: HOSPADM

## 2022-09-26 RX ORDER — METHOCARBAMOL 500 MG/1
500 TABLET, FILM COATED ORAL 4 TIMES DAILY PRN
Qty: 60 TABLET | Refills: 1 | Status: SHIPPED | OUTPATIENT
Start: 2022-09-26 | End: 2023-09-26

## 2022-09-26 RX ORDER — HYDROMORPHONE HCL IN WATER/PF 6 MG/30 ML
0.2 PATIENT CONTROLLED ANALGESIA SYRINGE INTRAVENOUS EVERY 5 MIN PRN
Status: DISCONTINUED | OUTPATIENT
Start: 2022-09-26 | End: 2022-09-26 | Stop reason: HOSPADM

## 2022-09-26 RX ORDER — AMOXICILLIN 250 MG
1 CAPSULE ORAL 2 TIMES DAILY
Status: DISCONTINUED | OUTPATIENT
Start: 2022-09-26 | End: 2022-09-27 | Stop reason: HOSPADM

## 2022-09-26 RX ORDER — ACETAMINOPHEN 325 MG/1
650 TABLET ORAL EVERY 4 HOURS PRN
Qty: 100 TABLET | Refills: 0 | Status: SHIPPED | OUTPATIENT
Start: 2022-09-26 | End: 2023-09-26

## 2022-09-26 RX ORDER — OXYCODONE HYDROCHLORIDE 5 MG/1
5 TABLET ORAL EVERY 4 HOURS PRN
Status: DISCONTINUED | OUTPATIENT
Start: 2022-09-26 | End: 2022-09-27 | Stop reason: HOSPADM

## 2022-09-26 RX ORDER — BUPIVACAINE HYDROCHLORIDE 7.5 MG/ML
INJECTION, SOLUTION INTRASPINAL
Status: COMPLETED | OUTPATIENT
Start: 2022-09-26 | End: 2022-09-26

## 2022-09-26 RX ORDER — METHOCARBAMOL 500 MG/1
500 TABLET, FILM COATED ORAL EVERY 6 HOURS PRN
Status: DISCONTINUED | OUTPATIENT
Start: 2022-09-26 | End: 2022-09-27 | Stop reason: HOSPADM

## 2022-09-26 RX ADMIN — CEFAZOLIN 1 G: 1 INJECTION, POWDER, FOR SOLUTION INTRAMUSCULAR; INTRAVENOUS at 14:23

## 2022-09-26 RX ADMIN — CEFAZOLIN 1 G: 1 INJECTION, POWDER, FOR SOLUTION INTRAMUSCULAR; INTRAVENOUS at 22:47

## 2022-09-26 RX ADMIN — OXYCODONE HYDROCHLORIDE 5 MG: 5 TABLET ORAL at 17:55

## 2022-09-26 RX ADMIN — ASPIRIN 325 MG ORAL TABLET 325 MG: 325 PILL ORAL at 12:35

## 2022-09-26 RX ADMIN — TRANEXAMIC ACID 1950 MG: 650 TABLET ORAL at 06:35

## 2022-09-26 RX ADMIN — OXYCODONE HYDROCHLORIDE 5 MG: 5 TABLET ORAL at 12:35

## 2022-09-26 RX ADMIN — MEPERIDINE HYDROCHLORIDE 12.5 MG: 25 INJECTION INTRAMUSCULAR; INTRAVENOUS; SUBCUTANEOUS at 09:19

## 2022-09-26 RX ADMIN — LIDOCAINE HYDROCHLORIDE 3 ML: 10 INJECTION, SOLUTION INFILTRATION; PERINEURAL at 07:40

## 2022-09-26 RX ADMIN — BUPIVACAINE HYDROCHLORIDE IN DEXTROSE 1.4 ML: 7.5 INJECTION, SOLUTION SUBARACHNOID at 07:34

## 2022-09-26 RX ADMIN — FENTANYL CITRATE 50 MCG: 50 INJECTION, SOLUTION INTRAMUSCULAR; INTRAVENOUS at 07:32

## 2022-09-26 RX ADMIN — SODIUM CHLORIDE, POTASSIUM CHLORIDE, SODIUM LACTATE AND CALCIUM CHLORIDE: 600; 310; 30; 20 INJECTION, SOLUTION INTRAVENOUS at 06:32

## 2022-09-26 RX ADMIN — PHENYLEPHRINE HYDROCHLORIDE 100 MCG: 10 INJECTION INTRAVENOUS at 07:53

## 2022-09-26 RX ADMIN — FENTANYL CITRATE 25 MCG: 50 INJECTION, SOLUTION INTRAMUSCULAR; INTRAVENOUS at 09:25

## 2022-09-26 RX ADMIN — FENTANYL CITRATE 25 MCG: 50 INJECTION, SOLUTION INTRAMUSCULAR; INTRAVENOUS at 09:19

## 2022-09-26 RX ADMIN — HYDROMORPHONE HYDROCHLORIDE 0.2 MG: 0.2 INJECTION, SOLUTION INTRAMUSCULAR; INTRAVENOUS; SUBCUTANEOUS at 09:31

## 2022-09-26 RX ADMIN — PROPOFOL 125 MCG/KG/MIN: 10 INJECTION, EMULSION INTRAVENOUS at 07:40

## 2022-09-26 RX ADMIN — PROPOFOL 40 MG: 10 INJECTION, EMULSION INTRAVENOUS at 07:40

## 2022-09-26 RX ADMIN — ACETAMINOPHEN 975 MG: 325 TABLET, FILM COATED ORAL at 06:35

## 2022-09-26 RX ADMIN — HYDROMORPHONE HYDROCHLORIDE 0.2 MG: 0.2 INJECTION, SOLUTION INTRAMUSCULAR; INTRAVENOUS; SUBCUTANEOUS at 09:45

## 2022-09-26 RX ADMIN — HYDROMORPHONE HYDROCHLORIDE 0.2 MG: 0.2 INJECTION, SOLUTION INTRAMUSCULAR; INTRAVENOUS; SUBCUTANEOUS at 09:37

## 2022-09-26 RX ADMIN — SODIUM CHLORIDE, POTASSIUM CHLORIDE, SODIUM LACTATE AND CALCIUM CHLORIDE: 600; 310; 30; 20 INJECTION, SOLUTION INTRAVENOUS at 12:36

## 2022-09-26 RX ADMIN — Medication 2 G: at 07:25

## 2022-09-26 RX ADMIN — FAMOTIDINE 20 MG: 20 TABLET ORAL at 20:56

## 2022-09-26 RX ADMIN — SENNOSIDES AND DOCUSATE SODIUM 1 TABLET: 50; 8.6 TABLET ORAL at 20:56

## 2022-09-26 RX ADMIN — MIDAZOLAM 2 MG: 1 INJECTION INTRAMUSCULAR; INTRAVENOUS at 07:25

## 2022-09-26 ASSESSMENT — ACTIVITIES OF DAILY LIVING (ADL)
ADLS_ACUITY_SCORE: 21
ADLS_ACUITY_SCORE: 25
ADLS_ACUITY_SCORE: 25
ADLS_ACUITY_SCORE: 21
ADLS_ACUITY_SCORE: 21
ADLS_ACUITY_SCORE: 31
ADLS_ACUITY_SCORE: 21

## 2022-09-26 NOTE — ANESTHESIA PREPROCEDURE EVALUATION
Anesthesia Pre-Procedure Evaluation    Patient: Samir Tomlin   MRN: [id number] : 1955        Procedure : Procedure(s):  LEFT TOTAL HIP ARTHROPLASTY          Past Medical History:   Diagnosis Date     Anemia      Arthritis      Breast cancer (H) 2011    left breast     Generalized anxiety disorder      GERD (gastroesophageal reflux disease)      Osteopenia      PONV (postoperative nausea and vomiting)       Past Surgical History:   Procedure Laterality Date     BIOPSY BREAST Left      CATARACT EXTRACTION Bilateral      CHOLECYSTECTOMY  1998     COSMETIC SURGERY  10/01/2011    reconstruction of left breast after masectomy     MAMMOPLASTY REDUCTION Right 2011     MASTECTOMY Left 2011    implant left breast      Allergies   Allergen Reactions     Erythromycin Base [Erythromycin] Unknown      Social History     Tobacco Use     Smoking status: Former Smoker     Packs/day: 0.50     Years: 15.00     Pack years: 7.50     Quit date: 12/10/2010     Years since quittin.8     Smokeless tobacco: Never Used     Tobacco comment: quit dec 2010   Substance Use Topics     Alcohol use: No      Wt Readings from Last 1 Encounters:   22 74.4 kg (164 lb)        Anesthesia Evaluation            ROS/MED HX  ENT/Pulmonary:  - neg pulmonary ROS     Neurologic:  - neg neurologic ROS     Cardiovascular:  - neg cardiovascular ROS     METS/Exercise Tolerance:     Hematologic:  - neg hematologic  ROS     Musculoskeletal:  - neg musculoskeletal ROS     GI/Hepatic:     (+) GERD, Asymptomatic on medication,     Renal/Genitourinary:  - neg Renal ROS     Endo:       Psychiatric/Substance Use:     (+) psychiatric history anxiety     Infectious Disease:  - neg infectious disease ROS     Malignancy:  - neg malignancy ROS     Other:  - neg other ROS          Physical Exam    Airway  airway exam normal      Mallampati: II   TM distance: > 3 FB   Neck ROM: full   Mouth opening: > 3 cm    Respiratory  Devices and Support         Dental  no notable dental history         Cardiovascular   cardiovascular exam normal       Rhythm and rate: regular and normal     Pulmonary   pulmonary exam normal        breath sounds clear to auscultation           OUTSIDE LABS:  CBC:   Lab Results   Component Value Date    WBC 5.0 09/26/2022    WBC 5.2 09/12/2022    HGB 12.9 09/26/2022    HGB 13.6 09/12/2022    HCT 40.3 09/26/2022    HCT 41.6 09/12/2022     09/26/2022     09/12/2022     BMP:   Lab Results   Component Value Date     (L) 07/07/2022     10/11/2019    POTASSIUM 4.1 07/07/2022    POTASSIUM 4.4 10/11/2019    CHLORIDE 100 07/07/2022    CHLORIDE 105 10/11/2019    CO2 24 07/07/2022    CO2 22 10/11/2019    BUN 13.4 07/07/2022    BUN 13 10/11/2019    CR 0.69 07/07/2022    CR 0.78 10/11/2019    GLC 74 07/07/2022    GLC 99 03/01/2021     COAGS:   Lab Results   Component Value Date    INR 1.00 09/26/2022     POC: No results found for: BGM, HCG, HCGS  HEPATIC:   Lab Results   Component Value Date    ALBUMIN 4.0 10/11/2019    PROTTOTAL 6.6 10/11/2019    ALT 22 10/11/2019    AST 34 10/11/2019    ALKPHOS 75 10/11/2019    BILITOTAL 0.6 10/11/2019     OTHER:   Lab Results   Component Value Date    A1C 5.4 10/11/2019    LANG 9.7 07/07/2022    TSH 1.65 10/11/2019       Anesthesia Plan    ASA Status:  2      Anesthesia Type: Spinal.      Maintenance: Balanced.        Consents    Anesthesia Plan(s) and associated risks, benefits, and realistic alternatives discussed. Questions answered and patient/representative(s) expressed understanding.    - Discussed:     - Discussed with:  Patient      - Extended Intubation/Ventilatory Support Discussed: No.      - Patient is DNR/DNI Status: No    Use of blood products discussed: No .     Postoperative Care    Pain management: IV analgesics, Oral pain medications.   PONV prophylaxis: Ondansetron (or other 5HT-3), Dexamethasone or Solumedrol     Comments:                Jair SOLER  MD Anni

## 2022-09-26 NOTE — OP NOTE
Total Hip Arthoplasty Operative Note        PLAN:  Weight bearing status: Weight bearing as tolerated   Activity: Activity as tolerated  Patient may move about with assist as indicated or with supervision   Anticoagulation plan:                 ASA 325mg po qd  for 42 days  Follow up plan                           Follow up in 2 week(s)        Name: Samir Tomlin    PCP: Ruthie Solis    Procedure Date: 9/26/2022    Pre-operative diagnosis: Osteoarthritis of left hip [M16.12]   Post-operative diagnosis: Same   Procedure: Total hip arthoplasty (Left)   Surgeon: Ritesh De Paz MD     Assistant(s): Lawrence Thompson PA-C   Anesthesia: Spinal Anesthesia   Estimated blood loss: 200 ml   Drains: Hemovac   Specimens: None       Findings: See full dictated operative note for details   Complications: None       Comments: See dictated operative report for full details     Indications:    The patient has experienced progressive left hip pain despite use of  Analgesics, NSAID's, Injection therapy and physical therapy. Because of the failure of these therapies to control symptoms and limited walking, night pain and altered activities the patient has decided to move ahead with joint replacement surgery.        Procedure and Findings:    After being informed of the risks, benefits, and alternatives to the procedure, the patient desired to proceed. Brought to the main operating suite where she was placed under spinal anesthetic. She was positioned in an Innomed hip hernandez. The patient s Left lower extremity was prepped and draped in a manner appropriate for the procedure after a timeout verification step was complete.    Patient received 2 grams of intravenous Ancef. Lawrence Thompson PA-C was present for the entire length of the case for the purposes of proper patient positioning, surgical exposure, and patient safety.    A minimally invasive posterior approach to the hip was taken. IT band was divided. Gluteus fibers  divided and the Charnley retractor was placed. Attention was directed towards the external rotators. The piriformis was identified and tagged. Minimus was elevated. The capsule was teed and tagged. Hip leg length and offset were then determined using a [employer] device with a pin in the innominate and the hip was then dislocated. The neck was resected using the [employer] guide. Full thickness cartilage loss was demonstrated involving the femoral head and acetabulum.     Attention was directed toward the acetabulum. Retractors were placed. Soft tissues were resected. Sequential reaming from 45 to 51 mm was carried out. Good cancellous bleeding bed was demonstrated. The trail 51 mm cup was stable. The final 50 mm Trident 2 HA PSL  cup was selected and secured with 2 supplemental fixation screws. A 10 degree liner was placed. Attention was directed towards the femur. Box chisel, canal finder, sequential broaching up to 5 was carried out. Trial reductions were carried out and excellent range of motion and stability and restoration of leg length and offset was demonstrated with a -4,32 head. X-ray was obtained which demonstrated appropriate sizing and positioning of components. The trial components were then removed. The final 10 degree liner was secured. Inferior osteophytes were resected from the acetabulum. The implant , 10 degree offset stem was the secured. Trial reductions were carried out and a -4, 32 mm head was selected. The hip was reduced. The joint was copiously irrigated. The joint capsule and piriformis tendon was repaired back to the greater trochanter through drill holes in bone. Soft tissues were infiltrated with anesthetic solution. Care was taken to avoid neurovascular structures.   The IT band was closed with running #1 running Stratafix stitch. Subcutaneous closure With layered 2-0 Vicryl, skin was closed with 3-0 Stratafix and Aquacell dressing. Sterile dressing was applied. Hip abductor pillow  was placed. The patient returned [facility] in stable condition.       Ritesh De Paz MD    Date: 9/26/2022 Time: 9:00 AM      CONFIDENTIALITY NOTICE This message and any included attachments are from [facility] and are intended only for the addressee. The information contained in this message is confidential and may constitute inside or non-public information under international, federal, or state securities laws. Unauthorized forwarding, printing, copying, distribution, or use of such information is strictly prohibited and may be unlawful. If you are not the addressee, please promptly delete this message and notify the sender of the delivery error by e-mail.

## 2022-09-26 NOTE — INTERVAL H&P NOTE
"I have reviewed the surgical (or preoperative) H&P that is linked to this encounter, and examined the patient. There are no significant changes    Clinical Conditions Present on Arrival:  Clinically Significant Risk Factors Present on Admission                   # Obesity: Estimated body mass index is 30 kg/m  as calculated from the following:    Height as of this encounter: 1.575 m (5' 2\").    Weight as of this encounter: 74.4 kg (164 lb).       "

## 2022-09-26 NOTE — CONSULTS
Integrative Therapy Consult    Healing PresenceYes  Essential Oils: Inhalation (EO/Topical oil)     Orthopedic Blend - HC       Healing Music: TV/Care channel     Breathwork:       Guided Imagery:       Acupressure:       Oshibori:       Energy Therapy: Healing touch     Healing Touch: Done     Reiki:       Qi Gong:     Massage:        Targeted Massage:    Sleep Promotion:       Other Therapy:       Intervention Reason: Well Being     Pre and Post Session Scores: Patient Desires Treatment: yes                             Delivery:         Referrals:      Lisha BERRY Born    Pt waas asleep when I finished treatment.

## 2022-09-26 NOTE — PROGRESS NOTES
09/26/22 1429   Quick Adds   Quick Adds Certification   Type of Visit Initial PT Evaluation   Living Environment   People in Home sibling(s)   Current Living Arrangements house   Home Accessibility stairs to enter home;stairs within home   Number of Stairs, Main Entrance 1   Number of Stairs, Within Home, Primary seven   Stair Railings, Within Home, Primary railing on right side (ascending);railings safe and in good condition   Transportation Anticipated family or friend will provide   Living Environment Comments Will be staying with brother who works from home and is available throughout the day   Self-Care   Usual Activity Tolerance excellent   Current Activity Tolerance good   Equipment Currently Used at Home cane, straight  (Has access to a FWW at home)   Fall history within last six months no   Activity/Exercise/Self-Care Comment Reports independence with cooking, cleaning, and cooking prior to surgery. Reports independence with mobility and ADLs prior to surgery.   General Information   Onset of Illness/Injury or Date of Surgery 09/26/22   Referring Physician Ritesh De Paz MD   Patient/Family Therapy Goals Statement (PT) To go home   Pertinent History of Current Problem (include personal factors and/or comorbidities that impact the POC) Status post L FREDO 9/26/22   Existing Precautions/Restrictions fall;no hip IR;90 degree hip flexion  (Patient has abduction wedge, but no adduction precautions under patient orders.)   Weight-Bearing Status - LLE weight-bearing as tolerated   Weight-Bearing Status - RLE full weight-bearing   Cognition   Affect/Mental Status (Cognition) WNL   Orientation Status (Cognition) oriented x 4   Follows Commands (Cognition) WNL   Range of Motion (ROM)   Range of Motion ROM deficits secondary to surgical procedure;ROM deficits secondary to pain   ROM Comment RLE WNL, LLE limited due to surgical procedure   Strength (Manual Muscle Testing)   Strength (Manual Muscle Testing)  strength is WFL   Strength Comments RLE WFL, did not formally assess LLE due to post op status   Bed Mobility   Bed Mobility supine-sit   Supine-Sit [place] (Bed Mobility) minimum assist (75% patient effort);1 person to manage equipment;verbal cues;nonverbal cues (demo/gesture)   Bed Mobility Limitations decreased ability to use legs for bridging/pushing   Impairments Contributing to Impaired Bed Mobility pain   Transfers   Transfers sit-stand transfer   Maintains Weight-bearing Status (Transfers) able to maintain   Transfer Safety Concerns Noted decreased step length   Impairments Contributing to Impaired Transfers pain   Sit-Stand Transfer   Sit-Stand [place] (Transfers) contact guard;1 person to manage equipment;verbal cues;nonverbal cues (demo/gesture)   Assistive Device (Sit-Stand Transfers) walker, front-wheeled   Gait/Stairs (Locomotion)   [place] Level (Gait) contact guard;1 person to manage equipment;verbal cues;nonverbal cues (demo/gesture)   Assistive Device (Gait) walker, front-wheeled   Distance in Feet (Required for LE Total Joints) 8   Pattern (Gait) step-to   Deviations/Abnormal Patterns (Gait) antalgic;juliann decreased;gait speed decreased;stride length decreased   Maintains Weight-bearing Status (Gait) able to maintain   Clinical Impression   Criteria for Skilled Therapeutic Intervention Yes, treatment indicated   PT Diagnosis (PT) Impaired functional mobility   Influenced by the following impairments Post op status, weakness, pain   Functional limitations due to impairments Bed mobility, transfers, gait, stairs   Clinical Presentation (PT Evaluation Complexity) Evolving/Changing   Clinical Presentation Rationale Patient presents as medically diagnosed   Clinical Decision Making (Complexity) moderate complexity   Planned Therapy Interventions (PT) bed mobility training;gait training;patient/family education;home exercise program;stair training;strengthening;transfer  training;progressive activity/exercise   Anticipated Equipment Needs at Discharge (PT) walker, rolling;cane, straight   Risk & Benefits of therapy have been explained evaluation/treatment results reviewed;risks/benefits reviewed;participants voiced agreement with care plan;participants included;patient;sibling   PT Discharge Planning   PT Discharge Recommendation (DC Rec) home with assist;home with home care physical therapy;Transitional Care Facility   PT Rationale for DC Rec Pending progress. Patient currently requires assist of 1 for bed mobility, transfers, and gait. She will be staying with her brother upon discharge from the hospital who has 1 step to enter the house and 7 steps to reach the bedroom; patient has not attempted stairs at this time. She has access to a FWW and single end cane at home. Her brother is available to provide assistance as needed. If patient is to discharge home she would require assist of 1 for all mobility and use of a walker. If this level of assistance cannot be provided at home, patient may benefit from discharging to TCU in order to improve strength and independence with mobility.   PT Brief overview of current status Patient moving well, able to tolerated strength exercises, transfers, and gait. Has not attempted stair navigation at this time and has 8 stairs to reach bedroom at brother's house. Will continue to reasess discharge recommendations in future sessions.   Therapy Certification   Start of care date 09/26/22   Certification date from 09/26/22   Certification date to 10/03/22   Medical Diagnosis left total hip arthroplasty   Total Evaluation Time   Total Evaluation Time (Minutes) 15   Physical Therapy Goals   PT Frequency 2x/day   PT Predicted Duration/Target Date for Goal Attainment 10/03/22   PT Goals Bed Mobility;Gait;Transfers;Stairs   PT: Bed Mobility Supervision/stand-by assist;Supine to/from sit;Bridging;Within precautions   PT: Transfers Supervision/stand-by  assist;Sit to/from stand;Bed to/from chair;Assistive device;Within precautions   PT: Gait Supervision/stand-by assist;Assistive device;150 feet;Within precautions   PT: Stairs Minimal assist;Assistive device;Within precautions;8 stairs;Rail on right     Salma Mckinley DPT

## 2022-09-26 NOTE — BRIEF OP NOTE
[facility]    Brief Operative Note    Pre-operative diagnosis: Osteoarthritis of left hip [M16.12]  Post-operative diagnosis Same as pre-operative diagnosis    Procedure: Procedure(s):  LEFT TOTAL HIP ARTHROPLASTY  Surgeon: Surgeon(s) and Role:     * Ritesh De Paz MD - Primary     * Lawrence Thompson PA-C - Assisting  Anesthesia: Choice   Estimated Blood Loss: 200 ml    Drains: None  Specimens: * No specimens in log *  Findings:   None.  Complications: None.  Implants:   Implant Name Type Inv. Item Serial No.  Lot No. LRB No. Used Action   GUIDE PIN LONG ACETABULAR - [id number] Wire GUIDE PIN LONG ACETABULAR  [employer] [id number] Left 1 Used as a Supply   SHELL ACTB 50MM HIP CH HA TRDNT II PSL D STRL 742-11-50D - [id number] Total Joint Component/Insert SHELL ACTB 50MM HIP CH HA TRDNT II PSL D STRL 742-11-50D  [employer] [id number] Left 1 Implanted   IMP SCR STRK LOW PROFILE HEX 6.5X25MM 2946-7954 - [id number] Metallic Hardware/[place] IMP SCR STRK LOW PROFILE HEX 6.5X25MM 2200-2958  [employer] [id number] Left 1 Implanted   IMP SCR STRK LOW PROFILE HEX 6.5X25MM 4645-2950 - [id number] Metallic Hardware/[place] IMP SCR STRK LOW PROFILE HEX 6.5X25MM 1229-1423  [employer] [id number] Left 1 Implanted   IMP STEM FEMORAL HIP STRK ACCOLADE II 127DEG SZ 4 7439-6840 - [id number] Total Joint Component/Insert IMP STEM FEMORAL HIP STRK ACCOLADE II 127DEG SZ 4 7185-4573  [employer] [id number] Left 1 Implanted   INSERT ACTB 10DEG 32MM 0D D HIP X3 TRDNT STRL LF - [id number] Total Joint Component/Insert INSERT ACTB 10DEG 32MM 0D D HIP X3 TRDNT STRL LF  [employer] [id number] Left 1 Implanted   IMP HEAD FEMORAL STRK BIOLOX DELTA CERAMIC V40 32MM - [id number] Total Joint Component/Insert IMP HEAD FEMORAL STRK BIOLOX DELTA CERAMIC V40 32MM  [employer] [id number] Left 1 Implanted

## 2022-09-26 NOTE — PLAN OF CARE
[facility]      OUTPATIENT PHYSICAL THERAPY EVALUATION  PLAN OF TREATMENT FOR OUTPATIENT REHABILITATION  (COMPLETE FOR INITIAL CLAIMS ONLY)  Patient's Last Name, First Name, M.I.  YOB: 1955  Samir Tomlin                        Provider's Name  [facility] Medical Record No.  [id number]                               Onset Date:  09/26/22   Start of Care Date:  09/26/22      Type:     _X_PT   ___OT   ___SLP Medical Diagnosis:  left total hip arthroplasty                        PT Diagnosis:  Impaired functional mobility   Visits from SOC:  1   _________________________________________________________________________________  Plan of Treatment/Functional Goals    Planned Interventions: bed mobility training, gait training, patient/family education, home exercise program, stair training, strengthening, transfer training, progressive activity/exercise     Goals: See Physical Therapy Goals on Care Plan in [employer] electronic health record.    Therapy Frequency: 2x/day  Predicted Duration of Therapy Intervention: 10/03/22  _________________________________________________________________________________    I CERTIFY THE NEED FOR THESE SERVICES FURNISHED UNDER        THIS PLAN OF TREATMENT AND WHILE UNDER MY CARE     (Physician co-signature of this document indicates review and certification of the therapy plan).              Certification date from: 09/26/22, Certification date to: 10/03/22    Referring Physician: Ritesh De Paz MD            Initial Assessment        See Physical Therapy evaluation dated 09/26/22 in Epic electronic health record.

## 2022-09-26 NOTE — PLAN OF CARE
Patient vital signs are at baseline: Yes  Patient able to ambulate as they were prior to admission or with assist devices provided by therapies during their stay:  No. Patient too numb to ambulate.   Patient MUST void prior to discharge:  No. Due to void  Patient able to tolerate oral intake:  Yes  Pain has adequate pain control using Oral analgesics:  Yes  Does patient have an identified :  Yes  Has goal D/C date and time been discussed with patient:  Yes  Shaheen De La Rosa RN

## 2022-09-26 NOTE — CONSULTS
"[facility]  Consult Note - Hospitalist Service  Date of Admission:  9/26/2022  Consult Requested by: Ritesh De Paz  Reason for Consult: Medical comobidities    Assessment & Plan   Samir Tomlin is a 67 year old female with a past medical history of breast cancer s/p massectomy, GERD, and osteoporosis. She is admitted on 9/26/2022 for left hip arthroplasty.    S/p left hip arthroplasty  Pre-hemoglobin at 13.6 and post-hemoglobin at 12.9.  - Pain control per ortho team  - PT, activity and weight bearing as tolerated  -  mg PO every day for 42 days  - Follow-up with ortho in 2 weeks    GERD  - PTA omeprazole    Osteopenia  - Hold PTA calcium supplement. Adequate diet will supply calcium       The patient's care was discussed with the Attending Physician, Dr. Chele Lindsay.    Viji Tiwari MD  [facility]  Text page via [facility] Paging/Directory       Hospitalist Service    Clinically Significant Risk Factors Present on Admission                    # Obesity: Estimated body mass index is 30 kg/m  as calculated from the following:    Height as of this encounter: 1.575 m (5' 2\").    Weight as of this encounter: 74.4 kg (164 lb).        ______________________________________________________________________    Chief Complaint   Osteoarthritis of left hip    History is obtained from the patient.    History of Present Illness   Samir Tomlin is a 67 year old female with a past medical history of breast cancer s/p massectomy, GERD, and osteoporosis. She is admitted on 9/26/2022 for left hip arthroplasty. She is POD #0.    She states her hip pain started about 2 years ago. She tried steroid injections without relief, and now has come in today for her left total hip replacement. Patient states she is tired and has no pain at this time. She denies nausea, abdominal pain, chest pain, and shortness of breath.    Review of Systems   The 10 point Review of " Systems is negative other than noted in the HPI or here.    Past Medical History    I have reviewed this patient's medical history and updated it with pertinent information if needed.   Past Medical History:   Diagnosis Date     Anemia      Arthritis      Breast cancer (H) 2011    left breast     Generalized anxiety disorder      GERD (gastroesophageal reflux disease)      Osteopenia      PONV (postoperative nausea and vomiting)        Past Surgical History   I have reviewed this patient's surgical history and updated it with pertinent information if needed.  Past Surgical History:   Procedure Laterality Date     BIOPSY BREAST Left      CATARACT EXTRACTION Bilateral      CHOLECYSTECTOMY  1998     COSMETIC SURGERY  10/01/2011    reconstruction of left breast after masectomy     MAMMOPLASTY REDUCTION Right 2011     MASTECTOMY Left 2011    implant left breast       Social History   I have reviewed this patient's social history and updated it with pertinent information if needed.  Social History     Tobacco Use     Smoking status: Former Smoker     Packs/day: 0.50     Years: 15.00     Pack years: 7.50     Quit date: 12/10/2010     Years since quittin.8     Smokeless tobacco: Never Used     Tobacco comment: quit dec 2010   Substance Use Topics     Alcohol use: No     Drug use: No       Family History   I have reviewed this patient's family history and updated it with pertinent information if needed.  Family History   Problem Relation Age of Onset     Cerebrovascular Disease Mother      Cancer Father      Lung Cancer Father      Diabetes Brother      Hypertension Brother      Heart Disease Brother      Diabetes Brother      Hypertension Brother      Heart Disease Brother         atrial flutter     Arthritis Brother      Breast Cancer Maternal Aunt      Breast Cancer Cousin      Breast Cancer Maternal Aunt        Medications   Medications Prior to Admission   Medication Sig Dispense Refill  Last Dose     acetaminophen (TYLENOL) 500 MG tablet Take 500-1,000 mg by mouth every 6 hours as needed for mild pain   Past Week at Unknown time     CALCIUM CARBONATE-VITAMIN D3 ORAL [CALCIUM CARBONATE-VITAMIN D3 ORAL] Take 1 tablet by mouth 2 (two) times a day. 500mg + 400iu vitamin d   Past Month at Unknown time     meloxicam (MOBIC) 15 MG tablet TAKE ONE TABLET BY MOUTH EVERY DAY WITH FOOD 90 tablet 1 9/15/2022     MULTIVITAMIN ORAL [MULTIVITAMIN ORAL] Take by mouth.   Past Month at Unknown time     omeprazole (PRILOSEC) 20 MG DR capsule Take 20 mg by mouth daily   9/25/2022 at Unknown time     POLYETHYLENE GLYCOL 3350 ORAL [POLYETHYLENE GLYCOL 3350 ORAL] Take 17 g by mouth daily.    9/25/2022 at Unknown time     traMADol (ULTRAM) 50 MG tablet Take 1 tablet (50 mg) by mouth every 6 hours as needed for severe pain (7-10) 15 tablet 0 9/25/2022 at 1700     vitamin E 400 UNIT capsule [VITAMIN E 400 UNIT CAPSULE] Take 1,000 Units by mouth daily.    Past Month at Unknown time       Allergies   Allergies   Allergen Reactions     Erythromycin Base [Erythromycin] Unknown       Physical Exam   Vital Signs: Temp: 97.5  F (36.4  C) Temp src: Temporal BP: 110/62 Pulse: 61   Resp: 16 SpO2: 99 % O2 Device: None (Room air) Oxygen Delivery: 6 LPM  Weight: 164 lbs 0 oz    General appearance: alert, in no distress, cooperative, appears stated age  Head: normocephalic, without obvious abnormalities  Eyes: conjunctivae/corneas clear, EOM intact  Ears: hearing grossly intact  Nose: nares normal, no drainage  Lung: clear to auscultation bilaterally, no wheezing, coughing, or use of accessory muscles  Chest wall: no tenderness  Heart: regular rate and rhythm, S1, S2 normal, no murmur, click, rub, or gallop  Abdomen: soft, non-tender, no masses or organomegaly  Extremities: warm, dry, no cyanosis  Skin: normal color, texture, and turgor  Neurologic: 0/5 in bilateral lower extremities, no sensation in lower extremities  bilaterally  Psychologic: appropriate mood      Data   Results for orders placed or performed during the hospital encounter of 09/26/22 (from the past 24 hour(s))   CBC with platelets   Result Value Ref Range    WBC Count 5.0 4.0 - 11.0 10e3/uL    RBC Count 4.68 3.80 - 5.20 10e6/uL    Hemoglobin 12.9 11.7 - 15.7 g/dL    Hematocrit 40.3 35.0 - 47.0 %    MCV 86 78 - 100 fL    MCH 27.6 26.5 - 33.0 pg    MCHC 32.0 31.5 - 36.5 g/dL    RDW 15.1 (H) 10.0 - 15.0 %    Platelet Count 178 150 - 450 10e3/uL   Potassium   Result Value Ref Range    Potassium 3.9 3.5 - 5.0 mmol/L   Creatinine   Result Value Ref Range    Creatinine 0.79 0.60 - 1.10 mg/dL    GFR Estimate 82 >60 mL/min/1.73m2   INR   Result Value Ref Range    INR 1.00 0.85 - 1.15   XR Hip Port Left 1 View    Narrative    EXAM: XR HIP PORT LEFT 1 VIEW  LOCATION: [facility]  DATE/TIME: 9/26/2022 8:31 AM    INDICATION: s p left joe  COMPARISON: None.      Impression    IMPRESSION: Interval total hip replacement with intraoperative radiograph. No evidence of an acute periprosthetic fracture. There is tubing along the superior aspect of the hip joint. Hardware appears in place on this single view. Please see dedicated   operative report for further details.   9/26/2022   [facility] :Faculty Attestation   I have seen and examined the patient.   I have discussed the case with the resident physician(s), Dr. Viji Tiwari.  I agree with the findings, assessment and plan.   Donny Lindsay MD

## 2022-09-26 NOTE — PHARMACY-ADMISSION MEDICATION HISTORY
Pharmacy Note - Admission Medication History    Pertinent Provider Information:   ______________________________________________________________________    Prior To Admission (PTA) med list completed and updated in EMR.       PTA Med List   Medication Sig Last Dose     acetaminophen (TYLENOL) 500 MG tablet Take 500-1,000 mg by mouth every 6 hours as needed for mild pain Past Week at Unknown time     CALCIUM CARBONATE-VITAMIN D3 ORAL [CALCIUM CARBONATE-VITAMIN D3 ORAL] Take 1 tablet by mouth 2 (two) times a day. 500mg + 400iu vitamin d Past Month at Unknown time     meloxicam (MOBIC) 15 MG tablet TAKE ONE TABLET BY MOUTH EVERY DAY WITH FOOD 9/15/2022     MULTIVITAMIN ORAL [MULTIVITAMIN ORAL] Take by mouth. Past Month at Unknown time     omeprazole (PRILOSEC) 20 MG DR capsule Take 20 mg by mouth daily 9/25/2022 at Unknown time     POLYETHYLENE GLYCOL 3350 ORAL [POLYETHYLENE GLYCOL 3350 ORAL] Take 17 g by mouth daily.  9/25/2022 at Unknown time     traMADol (ULTRAM) 50 MG tablet Take 1 tablet (50 mg) by mouth every 6 hours as needed for severe pain (7-10) 9/25/2022 at 1700     vitamin E 400 UNIT capsule [VITAMIN E 400 UNIT CAPSULE] Take 1,000 Units by mouth daily.  Past Month at Unknown time       Information source(s): Patient and CareEverywhere/[facility]    Method of interview communication: in-person    Patient was asked about OTC/herbal products specifically.  PTA med list reflects this.    Based on the pharmacist's assessment, the PTA med list information appears reliable    Allergies were reviewed, assessed, and updated with the patient.      Patient did not bring any medications to the hospital and can't retrieve from home. No multi-dose medications are available for use during hospital stay.      Thank you for the opportunity to participate in the care of this patient.      Lambert Nielsen RPH     9/26/2022     7:14 AM

## 2022-09-26 NOTE — ANESTHESIA PROCEDURE NOTES
Intrathecal injection Procedure Note    Pre-Procedure   Staff -        Anesthesiologist:  Jair Hutton MD       Performed By: anesthesiologist       Location: OR       Procedure Start/Stop Times: 9/26/2022 7:34 AM and 9/26/2022 7:36 AM       Pre-Anesthestic Checklist: patient identified, IV checked, risks and benefits discussed, informed consent, monitors and equipment checked, pre-op evaluation, at physician/surgeon's request and post-op pain management  Timeout:       Correct Patient: Yes        Correct Procedure: Yes        Correct Site: Yes        Correct Position: Yes   Procedure Documentation  Procedure: intrathecal injection       Patient Position: sitting       Skin prep: Chloraprep       Insertion Site: L3-4. (midline approach).       Needle Gauge: 24.        Needle Length (Inches): 4        Spinal Needle Type: Pencan       Introducer used       Introducer: 20 G       # of attempts: 1 and  # of redirects:  0    Assessment/Narrative         Paresthesias: No.       CSF fluid: clear.    Medication(s) Administered   0.75% Hyperbaric Bupivacaine (Intrathecal) - Intrathecal   1.4 mL - 9/26/2022 7:34:00 AM  Medication Administration Time: 9/26/2022 7:34 AM

## 2022-09-26 NOTE — ANESTHESIA POSTPROCEDURE EVALUATION
Patient: Samir Tomlin    Procedure: Procedure(s):  LEFT TOTAL HIP ARTHROPLASTY       Anesthesia Type:  Spinal    Note:  Disposition: Inpatient   Postop Pain Control: Uneventful            Sign Out: Well controlled pain   PONV: No   Neuro/Psych: Uneventful            Sign Out: Acceptable/Baseline neuro status   Airway/Respiratory: Uneventful            Sign Out: Acceptable/Baseline resp. status   CV/Hemodynamics: Uneventful            Sign Out: Acceptable CV status; No obvious hypovolemia; No obvious fluid overload   Other NRE: NONE   DID A NON-ROUTINE EVENT OCCUR? No           Last vitals:  Vitals Value Taken Time   /56 09/26/22 1130   Temp 36.3  C (97.4  F) 09/26/22 1100   Pulse 72 09/26/22 1130   Resp 16 09/26/22 1130   SpO2 98 % 09/26/22 1130       Electronically Signed By: Jair Hutton MD  September 26, 2022  12:38 PM

## 2022-09-26 NOTE — ANESTHESIA CARE TRANSFER NOTE
Patient: Samir Tomlin    Procedure: Procedure(s):  LEFT TOTAL HIP ARTHROPLASTY       Diagnosis: Osteoarthritis of left hip [M16.12]  Diagnosis Additional Information: No value filed.    Anesthesia Type:   Spinal     Note:    Oropharynx: oropharynx clear of all foreign objects  Level of Consciousness: awake  Oxygen Supplementation: face mask  Level of Supplemental Oxygen (L/min / FiO2): 6  Independent Airway: airway patency satisfactory and stable  Dentition: dentition unchanged  Vital Signs Stable: post-procedure vital signs reviewed and stable  Report to RN Given: handoff report given  Patient transferred to: PACU    Handoff Report: Identifed the Patient, Identified the Reponsible Provider, Reviewed the pertinent medical history, Discussed the surgical course, Reviewed Intra-OP anesthesia mangement and issues during anesthesia, Set expectations for post-procedure period and Allowed opportunity for questions and acknowledgement of understanding      Vitals:  Vitals Value Taken Time   /65 09/26/22 0903   Temp 36.4  C (97.6  F) 09/26/22 0903   Pulse 69 09/26/22 0903   Resp 16 09/26/22 0903   SpO2 100 % 09/26/22 0903   Vitals shown include unvalidated device data.    Electronically Signed By: HARRY BENTLEY CRNA  September 26, 2022  9:05 AM

## 2022-09-27 ENCOUNTER — APPOINTMENT (OUTPATIENT)
Dept: OCCUPATIONAL THERAPY | Facility: CLINIC | Age: 67
End: 2022-09-27
Attending: ORTHOPAEDIC SURGERY
Payer: COMMERCIAL

## 2022-09-27 ENCOUNTER — APPOINTMENT (OUTPATIENT)
Dept: PHYSICAL THERAPY | Facility: CLINIC | Age: 67
End: 2022-09-27
Attending: ORTHOPAEDIC SURGERY
Payer: COMMERCIAL

## 2022-09-27 VITALS
DIASTOLIC BLOOD PRESSURE: 60 MMHG | OXYGEN SATURATION: 94 % | HEART RATE: 68 BPM | BODY MASS INDEX: 30.18 KG/M2 | WEIGHT: 164 LBS | TEMPERATURE: 99 F | SYSTOLIC BLOOD PRESSURE: 130 MMHG | RESPIRATION RATE: 16 BRPM | HEIGHT: 62 IN

## 2022-09-27 LAB
FASTING STATUS PATIENT QL REPORTED: NORMAL
GLUCOSE BLD-MCNC: 119 MG/DL (ref 70–125)
HGB BLD-MCNC: 10.8 G/DL (ref 11.7–15.7)

## 2022-09-27 PROCEDURE — 250N000013 HC RX MED GY IP 250 OP 250 PS 637: Performed by: ORTHOPAEDIC SURGERY

## 2022-09-27 PROCEDURE — 97535 SELF CARE MNGMENT TRAINING: CPT | Mod: GO

## 2022-09-27 PROCEDURE — 250N000013 HC RX MED GY IP 250 OP 250 PS 637

## 2022-09-27 PROCEDURE — 82947 ASSAY GLUCOSE BLOOD QUANT: CPT | Performed by: ORTHOPAEDIC SURGERY

## 2022-09-27 PROCEDURE — 97166 OT EVAL MOD COMPLEX 45 MIN: CPT | Mod: GO

## 2022-09-27 PROCEDURE — 97116 GAIT TRAINING THERAPY: CPT | Mod: GP

## 2022-09-27 PROCEDURE — 36415 COLL VENOUS BLD VENIPUNCTURE: CPT | Performed by: ORTHOPAEDIC SURGERY

## 2022-09-27 PROCEDURE — 97110 THERAPEUTIC EXERCISES: CPT | Mod: GP

## 2022-09-27 PROCEDURE — 85018 HEMOGLOBIN: CPT | Performed by: ORTHOPAEDIC SURGERY

## 2022-09-27 PROCEDURE — 99225 PR SUBSEQUENT OBSERVATION CARE,LEVEL II: CPT | Performed by: FAMILY MEDICINE

## 2022-09-27 RX ADMIN — OXYCODONE HYDROCHLORIDE 5 MG: 5 TABLET ORAL at 00:57

## 2022-09-27 RX ADMIN — FAMOTIDINE 20 MG: 20 TABLET ORAL at 08:50

## 2022-09-27 RX ADMIN — POLYETHYLENE GLYCOL 3350 17 G: 17 POWDER, FOR SOLUTION ORAL at 08:50

## 2022-09-27 RX ADMIN — OXYCODONE HYDROCHLORIDE 5 MG: 5 TABLET ORAL at 06:44

## 2022-09-27 RX ADMIN — SENNOSIDES AND DOCUSATE SODIUM 1 TABLET: 50; 8.6 TABLET ORAL at 08:50

## 2022-09-27 RX ADMIN — PANTOPRAZOLE SODIUM 40 MG: 20 TABLET, DELAYED RELEASE ORAL at 08:50

## 2022-09-27 RX ADMIN — OXYCODONE HYDROCHLORIDE 5 MG: 5 TABLET ORAL at 11:17

## 2022-09-27 RX ADMIN — ASPIRIN 325 MG ORAL TABLET 325 MG: 325 PILL ORAL at 08:50

## 2022-09-27 ASSESSMENT — ACTIVITIES OF DAILY LIVING (ADL)
ADLS_ACUITY_SCORE: 21
ADLS_ACUITY_SCORE: 21
ADLS_ACUITY_SCORE: 25
ADLS_ACUITY_SCORE: 21
ADLS_ACUITY_SCORE: 25
IADL_COMMENTS: IND FOR IADLS
ADLS_ACUITY_SCORE: 25
ADLS_ACUITY_SCORE: 25

## 2022-09-27 NOTE — PLAN OF CARE
Goal Outcome Evaluation:       Patient vital signs are at baseline: Yes  Patient able to ambulate as they were prior to admission or with assist devices provided by therapies during their stay:  Yes  Patient MUST void prior to discharge:  Yes  Patient able to tolerate oral intake:  Yes  Pain has adequate pain control using Oral analgesics:  Yes  Does patient have an identified :  Yes  Has goal D/C date and time been discussed with patient:  Yes     Patient alert and oriented x4. Pain managed with PRN oxycodone. Assist of 1 with gait belt and walker. CMS intact. Dressing CDI. Voiding adequately. VSS. Plan to discharge today.

## 2022-09-27 NOTE — PLAN OF CARE
Patient vital signs are at baseline: Yes  Patient able to ambulate as they were prior to admission or with assist devices provided by therapies during their stay:  Yes  Patient MUST void prior to discharge:  Yes  Patient able to tolerate oral intake:  Yes  Pain has adequate pain control using Oral analgesics:  Yes - PRN Oxycodone given and effective.   Does patient have an identified :  Yes  Has goal D/C date and time been discussed with patient:  Yes - pt plans to discharge home with brother.

## 2022-09-27 NOTE — PROGRESS NOTES
Care Management Initial Consult    General Information  Assessment completed with: VM-chart review,    Type of CM/SW Visit: Chart Assessment      Additional Information:  Chart reviewed.  Per therapy notes, Pt plans to discharge to her brother's home where she will stay for 2 weeks.  Pt's brother works from home and will be available to assist as needed.  CM following to assist as needed with discharge planning.     ELOY Landeros

## 2022-09-27 NOTE — CONSULTS
"[facility]  Consult Note - Hospitalist Service  Date of Admission:  9/26/2022  Consult Requested by: Ritesh De Paz  Reason for Consult: Medical comobidities    Assessment & Plan   Samir Tomlin is a 67 year old female with a past medical history of breast cancer s/p massectomy, GERD, and osteoporosis. She is admitted on 9/26/2022 for left hip arthroplasty. Okay to discharge from medical standpoint.    S/p left hip arthroplasty  Pre-hemoglobin at 13.6 and post-hemoglobin at 10.8. Passing gas, tolerating diet, and mobile.  - Pain control per ortho team  - PT, activity and weight bearing as tolerated  -  mg PO every day for 42 days  - Follow-up with ortho in 2 weeks    GERD  - PTA omeprazole    Osteopenia  - Hold PTA calcium supplement. Adequate diet will supply calcium       The patient's care was discussed with the Attending Physician, Dr. Donny Lindsay.    Viji Tiwari MD  [facility]  Text page via [employer] Paging/Directory       Hospitalist Service    Clinically Significant Risk Factors Present on Admission                    # Obesity: Estimated body mass index is 30 kg/m  as calculated from the following:    Height as of this encounter: 1.575 m (5' 2\").    Weight as of this encounter: 74.4 kg (164 lb).        ______________________________________________________________________    Chief Complaint   Osteoarthritis of left hip    History is obtained from the patient.    History of Present Illness   Samir Tomlin is a 67 year old female with a past medical history of breast cancer s/p massectomy, GERD, and osteoporosis. She is admitted on 9/26/2022 for left hip arthroplasty. She is POD #1.    Patient states her pain is well controlled. She will be living with her brother for the next 2 weeks. She will have 7-8 stairs at that location. She is working well with PT. She is learning to toilet on her own.    Review of Systems   The 10 point Review " of Systems is negative other than noted in the HPI or here.    Past Medical History    I have reviewed this patient's medical history and updated it with pertinent information if needed.   Past Medical History:   Diagnosis Date     Anemia      Arthritis      Breast cancer (H) 2011    left breast     Generalized anxiety disorder      GERD (gastroesophageal reflux disease)      Osteopenia      PONV (postoperative nausea and vomiting)        Past Surgical History   I have reviewed this patient's surgical history and updated it with pertinent information if needed.  Past Surgical History:   Procedure Laterality Date     BIOPSY BREAST Left      CATARACT EXTRACTION Bilateral      CHOLECYSTECTOMY  1998     COSMETIC SURGERY  10/01/2011    reconstruction of left breast after masectomy     MAMMOPLASTY REDUCTION Right 2011     MASTECTOMY Left 2011    implant left breast       Social History   I have reviewed this patient's social history and updated it with pertinent information if needed.  Social History     Tobacco Use     Smoking status: Former Smoker     Packs/day: 0.50     Years: 15.00     Pack years: 7.50     Quit date: 12/10/2010     Years since quittin.8     Smokeless tobacco: Never Used     Tobacco comment: quit dec 2010   Substance Use Topics     Alcohol use: No     Drug use: No       Family History   I have reviewed this patient's family history and updated it with pertinent information if needed.  Family History   Problem Relation Age of Onset     Cerebrovascular Disease Mother      Cancer Father      Lung Cancer Father      Diabetes Brother      Hypertension Brother      Heart Disease Brother      Diabetes Brother      Hypertension Brother      Heart Disease Brother         atrial flutter     Arthritis Brother      Breast Cancer Maternal Aunt      Breast Cancer Cousin      Breast Cancer Maternal Aunt        Medications   Medications Prior to Admission   Medication Sig Dispense  Refill Last Dose     acetaminophen (TYLENOL) 500 MG tablet Take 500-1,000 mg by mouth every 6 hours as needed for mild pain   Past Week at Unknown time     CALCIUM CARBONATE-VITAMIN D3 ORAL [CALCIUM CARBONATE-VITAMIN D3 ORAL] Take 1 tablet by mouth 2 (two) times a day. 500mg + 400iu vitamin d   Past Month at Unknown time     meloxicam (MOBIC) 15 MG tablet TAKE ONE TABLET BY MOUTH EVERY DAY WITH FOOD 90 tablet 1 9/15/2022     MULTIVITAMIN ORAL [MULTIVITAMIN ORAL] Take by mouth.   Past Month at Unknown time     omeprazole (PRILOSEC) 20 MG DR capsule Take 20 mg by mouth daily   9/25/2022 at Unknown time     POLYETHYLENE GLYCOL 3350 ORAL [POLYETHYLENE GLYCOL 3350 ORAL] Take 17 g by mouth daily.    9/25/2022 at Unknown time     traMADol (ULTRAM) 50 MG tablet Take 1 tablet (50 mg) by mouth every 6 hours as needed for severe pain (7-10) 15 tablet 0 9/25/2022 at 1700     vitamin E 400 UNIT capsule [VITAMIN E 400 UNIT CAPSULE] Take 1,000 Units by mouth daily.    Past Month at Unknown time       Allergies   Allergies   Allergen Reactions     Erythromycin Base [Erythromycin] Unknown       Physical Exam   Vital Signs: Temp: 99  F (37.2  C) Temp src: Oral BP: 130/60 Pulse: 68   Resp: 16 SpO2: 94 % O2 Device: None (Room air) Oxygen Delivery: 2 LPM  Weight: 164 lbs 0 oz    General appearance: alert, in no distress, cooperative, appears stated age  Head: normocephalic, without obvious abnormalities  Eyes: conjunctivae/corneas clear  Ears: hearing grossly intact  Nose: nares normal, no drainage  Lung: clear to auscultation bilaterally, no wheezing, coughing, or use of accessory muscles  Chest wall: no tenderness  Heart: regular rate and rhythm, S1, S2 normal, no murmur, click, rub, or gallop  Abdomen: soft, non-tender, no masses or organomegaly  Extremities: warm, dry, no cyanosis  Pulses: +2 bilaterally  Skin: normal color, texture, and turgor  Neurologic: sensation grossly intact in bilateral lower extremities  Psychologic:  appropriate mood    Data   Results for orders placed or performed during the hospital encounter of 09/26/22 (from the past 24 hour(s))   XR Pelvis Port 1/2 Views    Narrative    EXAM: XR PELVIS PORT 1/2 VIEWS  LOCATION: [facility]  DATE/TIME: 9/26/2022 10:00 AM    INDICATION: Status post Hip surgery  COMPARISON: None.      Impression    IMPRESSION: Postoperative changes left total hip arthroplasty. Components appear well seated. Right hip negative for fracture. Pelvis negative for fracture.   Hemoglobin   Result Value Ref Range    Hemoglobin 10.8 (L) 11.7 - 15.7 g/dL   Glucose   Result Value Ref Range    Glucose 119 70 - 125 mg/dL    Patient Fasting > 8hrs? Unknown      9/27/2022   [facility] :Faculty Attestation   I have seen and examined the patient.   I have discussed the case with the resident physician(s), Dr. Viji Tiwari.  I agree with the findings, assessment and plan.   Donny Lindsay MD

## 2022-09-27 NOTE — PROGRESS NOTES
09/27/22 0730   Quick Adds   Quick Adds Certification   Type of Visit Initial Occupational Therapy Evaluation   Living Environment   People in Home alone   Current Living Arrangements house  ([facility])   Living Environment Comments will be staying with brother who has a tub shower (pt is planning on taking sponge baths until she's home where she has a walk-in)   Self-Care   Equipment Currently Used at Home cane, straight  (has FWW, sock aid, shower chair, and raised toilet seat with bilateral bars)   Fall history within last six months no   Activity/Exercise/Self-Care Comment ind for ADLs   Instrumental Activities of Daily Living (IADL)   IADL Comments ind for IADLs   General Information   Onset of Illness/Injury or Date of Surgery 09/26/22   Referring Physician Ritesh De Paz MD   Patient/Family Therapy Goal Statement (OT) go to brother's home   Additional Occupational Profile Info/Pertinent History of Current Problem s/p L FREDO   Existing Precautions/Restrictions no hip IR;no hip ADD past midline;90 degree hip flexion   Left Lower Extremity (Weight-bearing Status) weight-bearing as tolerated (WBAT)   Cognitive Status Examination   Orientation Status orientation to person, place and time   Affect/Mental Status (Cognitive) WFL   Follows Commands WFL   Pain Assessment   Patient Currently in Pain Yes, see Vital Sign flowsheet   Range of Motion Comprehensive   General Range of Motion no range of motion deficits identified   Strength Comprehensive (MMT)   General Manual Muscle Testing (MMT) Assessment no strength deficits identified   Bed Mobility   Bed Mobility supine-sit;sit-supine   Supine-Sit [employer] Mdl (Bed Mobility) minimum assist (75% patient effort);verbal cues   Sit-Supine [employer] Mdl (Bed Mobility) minimum assist (75% patient effort);verbal cues   Transfers   Transfers sit-stand transfer;toilet transfer   Sit-Stand Transfer   Sit-Stand [employer] Mdl (Transfers) contact guard;verbal cues   Toilet  Transfer   Type (Toilet Transfer) sit-stand;stand-sit   [place] Level (Toilet Transfer) contact guard;verbal cues   Activities of Daily Living   BADL Assessment/Intervention lower body dressing   Lower Body Dressing Assessment/Training   Position (Lower Body Dressing) unsupported sitting   [place] Level (Lower Body Dressing) maximum assist (25% patient effort)   Clinical Impression   Criteria for Skilled Therapeutic Interventions Met (OT) Yes, treatment indicated   OT Diagnosis dec ADL indep   OT Problem List-Impairments impacting ADL problems related to;activity tolerance impaired;post-surgical precautions;pain;mobility   Assessment of Occupational Performance 3-5 Performance Deficits   Identified Performance Deficits dressing, toileting, bed mobility, functional transfers   Planned Therapy Interventions (OT) ADL retraining;bed mobility training;transfer training;home program guidelines;progressive activity/exercise   Clinical Decision Making Complexity (OT) moderate complexity   Anticipated Equipment Needs Upon Discharge (OT) dressing equipment   Risk & Benefits of therapy have been explained evaluation/treatment results reviewed;participants included;patient   OT Discharge Planning   OT Discharge Recommendation (DC Rec) (S)  home with assist   OT Rationale for DC Rec pt planning to d/c to 's home for 2 weeks.  is available 24/7 as he works from home.   Therapy Certification   Start of Care Date 09/27/22   Certification date from 09/27/22   Certification date to 10/04/22   Medical Diagnosis FREDO   Total Evaluation Time (Minutes)   Total Evaluation Time (Minutes) 10   OT Goals   Therapy Frequency (OT) One time eval and treatment   OT Predicted Duration/Target Date for Goal Attainment 09/27/22   OT Goals Lower Body Dressing;Bed Mobility;Toilet Transfer/Toileting   OT: Lower Body Dressing Supervision/stand-by assist;within precautions;using adaptive equipment   OT: Bed Mobility Modified  independent;supine to/from sitting;within precautions   OT: Toilet Transfer/Toileting Modified independent;cleaning and garment management;toilet transfer

## 2022-09-27 NOTE — PLAN OF CARE
"Goal Outcome Evaluation:     Discharge instruction and education including \"stop light\" tool provided to pt. Pt acknowledge understanding. Pt is being discharge to home with family transport.                     "

## 2022-09-27 NOTE — PROGRESS NOTES
Physical Therapy Discharge Summary    Reason for therapy discharge:    All goals and outcomes met, no further needs identified.    Progress towards therapy goal(s). See goals on Care Plan in [facility] electronic health record for goal details.  Goals met    Therapy recommendation(s):    Continue home exercise program.

## 2022-09-27 NOTE — PLAN OF CARE
[facility]      OUTPATIENT OCCUPATIONAL THERAPY  EVALUATION  PLAN OF TREATMENT FOR OUTPATIENT REHABILITATION  (COMPLETE FOR INITIAL CLAIMS ONLY)  Patient's Last Name, First Name, M.I.  YOB: 1955  Samir Tomlin                          Provider's Name  [facility] Medical Record No.  [id number]                               Onset Date:  09/26/22   Start of Care Date:  09/27/22     Type:     ___PT   _X_OT   ___SLP Medical Diagnosis:  FREDO                        OT Diagnosis:  dec ADL indep   Visits from SOC:  1   _________________________________________________________________________________  Plan of Treatment/Functional Goals    Planned Interventions: ADL retraining, bed mobility training, transfer training, home program guidelines, progressive activity/exercise   Goals: See Occupational Therapy Goals on Care Plan in [employer] electronic health record.    Therapy Frequency: One time eval and treatment  Predicted Duration of Therapy Intervention: 09/27/22  _________________________________________________________________________________    I CERTIFY THE NEED FOR THESE SERVICES FURNISHED UNDER        THIS PLAN OF TREATMENT AND WHILE UNDER MY CARE     (Physician co-signature of this document indicates review and certification of the therapy plan).                Certification date from: 09/27/22, Certification date to: 10/04/22    Referring Physician: Ritesh De Paz MD            Initial Assessment        See Occupational Therapy evaluation dated 09/27/22 in Epic electronic health record.

## 2022-09-27 NOTE — DISCHARGE SUMMARY
[facility] Orthopedics Discharge Summary                                  [facility]     NATHAN IRAHETA [id number]   Age: 67 year old  PCP: Ruthie Solis, [phone] 1955     Date of Admission:  9/26/2022  Date of Discharge::  9/27/2022  Discharge Provider:  Agapito Valencia NP    Code status:  Full Code    Admission Information:  Admission Diagnosis:  Osteoarthritis of left hip [M16.12]  Degenerative joint disease of left hip [M16.12]    Post-Operative Day: 1 Day Post-Op     Reason for admission:  The patient was admitted for the following:Procedure(s) (LRB):  LEFT TOTAL HIP ARTHROPLASTY (Left)    Active Problems:    * No active hospital problems. *      Allergies:  Erythromycin base [erythromycin]    Following the procedure noted above the patient was transferred to the post-op floor and started on:    Therapy:  physical therapy and occupational therapy  Anticoagulation Plan: scoanticoagulationlist2:  mg daily  for 42 days  Pain Management: scopainmedication: oxycodone.  Acute Blood Loss Anemia: non symptomatic, no further treatment or monitoring necessary at this time.  Weight bearing status: Weight bearing as tolerated     The patient was followed by Orthopedics during the inpatient treatment course:  Complications:  None  Additional consultations:  HMS, expertise and consultation appreciated.      Pertinent Labs   Lab Results: personally reviewed.     Recent Labs   Lab Test 09/27/22  0552 09/26/22  0610 09/12/22  0736 07/07/22  0906 03/01/21  0747 10/11/19  0841 05/28/19  1303 09/24/18  1033   INR  --  1.00  --   --   --   --   --   --    HGB 10.8* 12.9 13.6  --    < > 13.7   < > 13.7   HCT  --  40.3 41.6  --   --  40.7   < >  --    MCV  --  86 86  --   --  85   < >  --    PLT  --  178 203  --   --  193   < >  --    NA  --   --   --  134*  --  137  --  140    < > = values in this interval not displayed.          Discharge Information:  Condition at discharge: Stable  Discharge  destination:  Discharged to home     Medications at discharge:  Current Discharge Medication List      START taking these medications    Details   !! acetaminophen (TYLENOL) 325 MG tablet Take 2 tablets (650 mg) by mouth every 4 hours as needed for other (mild pain)  Qty: 100 tablet, Refills: 0    Associated Diagnoses: Status post total replacement of left hip      aspirin (ASA) 325 MG EC tablet Take 1 tablet (325 mg) by mouth daily  Qty: 30 tablet, Refills: 0    Associated Diagnoses: Status post total replacement of left hip      methocarbamol (ROBAXIN) 500 MG tablet Take 1 tablet (500 mg) by mouth 4 times daily as needed for muscle spasms  Qty: 60 tablet, Refills: 1    Associated Diagnoses: Status post total replacement of left hip      oxyCODONE (ROXICODONE) 5 MG tablet Take 1-2 tablets (5-10 mg) by mouth every 4 hours as needed for moderate to severe pain  Qty: 25 tablet, Refills: 0    Associated Diagnoses: Status post total replacement of left hip      senna-docusate (SENOKOT-S/PERICOLACE) 8.6-50 MG tablet Take 1-2 tablets by mouth 2 times daily Take while on oral narcotics to prevent or treat constipation.  Qty: 30 tablet, Refills: 0    Comments: While taking narcotics  Associated Diagnoses: Status post total replacement of left hip       !! - Potential duplicate medications found. Please discuss with provider.      CONTINUE these medications which have NOT CHANGED    Details   !! acetaminophen (TYLENOL) 500 MG tablet Take 500-1,000 mg by mouth every 6 hours as needed for mild pain      CALCIUM CARBONATE-VITAMIN D3 ORAL [CALCIUM CARBONATE-VITAMIN D3 ORAL] Take 1 tablet by mouth 2 (two) times a day. 500mg + 400iu vitamin d      meloxicam (MOBIC) 15 MG tablet TAKE ONE TABLET BY MOUTH EVERY DAY WITH FOOD  Qty: 90 tablet, Refills: 1    Associated Diagnoses: Hip pain, left; Osteoarthritis of left hip, unspecified osteoarthritis type      MULTIVITAMIN ORAL [MULTIVITAMIN ORAL] Take by mouth.      omeprazole  (PRILOSEC) 20 MG DR capsule Take 20 mg by mouth daily      POLYETHYLENE GLYCOL 3350 ORAL [POLYETHYLENE GLYCOL 3350 ORAL] Take 17 g by mouth daily.       vitamin E 400 UNIT capsule [VITAMIN E 400 UNIT CAPSULE] Take 1,000 Units by mouth daily.        !! - Potential duplicate medications found. Please discuss with provider.      STOP taking these medications       traMADol (ULTRAM) 50 MG tablet Comments:   Reason for Stopping:                          Follow-Up Care:  Patient should be seen in the office in 10-14 days by the Orthopedic Surgeon/Physician Assistant.  Call [phone] for appointment or questions.    It was my pleasure to take care of the above patient.  Agapito Valencia NP

## 2022-09-27 NOTE — PLAN OF CARE
Note from 0700 to discharge. Discharge paperwork addressed thoroughly with pt by the discharge RN. AVS sent with pt. IV access discontinued. No issues noted. VSS, no shortness of breath.    Patient vital signs are at baseline: Yes  Patient able to ambulate as they were prior to admission or with assist devices provided by therapies during their stay:  Yes  Patient MUST void prior to discharge:  Yes  Patient able to tolerate oral intake:  Yes  Pain has adequate pain control using Oral analgesics:  Yes  Does patient have an identified :  Yes  Has goal D/C date and time been discussed with patient:  Yes    Taylor R Schoenecker, RN

## 2022-09-27 NOTE — PROGRESS NOTES
"[facility] Orthopaedics Progress Note      Post-operative Day: 1 Day Post-Op    Procedure(s):  LEFT TOTAL HIP ARTHROPLASTY        Plan: Anticoagulation protocol: scoanticoagulationlist2:  mg daily  x 42  Days.  Acute Blood Loss Anemia: non symptomatic, no further treatment or monitoring necessary at this time.            Pain medications:  scopainmedication: oxycodone            Weight bearing status:  WBAT            Disposition:  Home today after therapies             Continue cares and rehabilitation     Subjective:  Evelin Crump is a pleasant 67 year old woman who is seated, dressed in her recliner. She is doing well this morning.   Pain: minimal and moderate  Chest pain, SOB:  No  Denies lightheadedness/dizziness  Denies nausea/ vomiting  Passing flatus  voiding well    Objective:  Blood pressure 130/60, pulse 68, temperature 99  F (37.2  C), temperature source Oral, resp. rate 16, height 1.575 m (5' 2\"), weight 74.4 kg (164 lb), SpO2 94 %.    Patient Vitals for the past 24 hrs:   BP Temp Temp src Pulse Resp SpO2   09/27/22 0811 130/60 99  F (37.2  C) Oral 68 16 --   09/27/22 0014 120/63 98.8  F (37.1  C) Oral 62 16 94 %   09/26/22 2245 110/59 98.8  F (37.1  C) Oral 62 17 95 %   09/26/22 1845 112/68 98.4  F (36.9  C) Oral 68 16 98 %   09/26/22 1445 109/61 97.9  F (36.6  C) Oral 69 18 97 %   09/26/22 1345 106/59 97.9  F (36.6  C) Oral 66 18 97 %   09/26/22 1245 103/63 97.9  F (36.6  C) Oral 73 18 96 %   09/26/22 1215 109/54 98.6  F (37  C) Oral 62 18 95 %   09/26/22 1145 108/56 98.7  F (37.1  C) Oral 68 16 93 %   09/26/22 1130 114/56 -- -- 72 16 98 %   09/26/22 1100 112/59 97.4  F (36.3  C) Temporal 68 -- 97 %   09/26/22 1050 -- -- -- 63 -- 99 %   09/26/22 1040 -- -- -- 61 -- 97 %   09/26/22 1030 114/64 -- -- 60 16 99 %   09/26/22 1020 116/65 -- -- 63 16 93 %   09/26/22 1010 106/58 -- -- 61 18 91 %   09/26/22 1000 103/59 -- -- 62 18 96 %   09/26/22 0950 110/62 -- -- 61 16 99 %   09/26/22 0940 113/59 97.5 "  F (36.4  C) Temporal 60 19 99 %       Wt Readings from Last 4 Encounters:   09/26/22 74.4 kg (164 lb)   09/12/22 76.7 kg (169 lb 3.2 oz)   07/07/22 74 kg (163 lb 1.6 oz)   09/01/21 75.9 kg (167 lb 4.8 oz)         Motor function, sensation, and circulation intact   Yes  Wound status: Aquacel is clean, without shadowing, dry, and intact. Yes  Calf tenderness: Bilateral  No    Pertinent Labs   Lab Results: personally reviewed.     Recent Labs   Lab Test 09/27/22  0552 09/26/22  0610 09/12/22  0736 07/07/22  0906 03/01/21  0747 10/11/19  0841 05/28/19  1303 09/24/18  1033   INR  --  1.00  --   --   --   --   --   --    HGB 10.8* 12.9 13.6  --    < > 13.7   < > 13.7   HCT  --  40.3 41.6  --   --  40.7   < >  --    MCV  --  86 86  --   --  85   < >  --    PLT  --  178 203  --   --  193   < >  --    NA  --   --   --  134*  --  137  --  140    < > = values in this interval not displayed.       Report completed by:  Agapito Valencia NP  Date: 9/27/2022

## 2022-11-04 ENCOUNTER — TRANSFERRED RECORDS (OUTPATIENT)
Dept: HEALTH INFORMATION MANAGEMENT | Facility: CLINIC | Age: 67
End: 2022-11-04

## 2022-12-01 ENCOUNTER — OFFICE VISIT (OUTPATIENT)
Dept: FAMILY MEDICINE | Facility: CLINIC | Age: 67
End: 2022-12-01
Payer: COMMERCIAL

## 2022-12-01 ENCOUNTER — TELEPHONE (OUTPATIENT)
Dept: FAMILY MEDICINE | Facility: CLINIC | Age: 67
End: 2022-12-01

## 2022-12-01 VITALS
BODY MASS INDEX: 30.73 KG/M2 | HEART RATE: 64 BPM | WEIGHT: 168 LBS | RESPIRATION RATE: 16 BRPM | SYSTOLIC BLOOD PRESSURE: 136 MMHG | DIASTOLIC BLOOD PRESSURE: 83 MMHG | TEMPERATURE: 98.2 F | OXYGEN SATURATION: 98 %

## 2022-12-01 DIAGNOSIS — R60.0 LOWER EXTREMITY EDEMA: Primary | ICD-10-CM

## 2022-12-01 LAB
ANION GAP SERPL CALCULATED.3IONS-SCNC: 15 MMOL/L (ref 7–15)
BUN SERPL-MCNC: 17.6 MG/DL (ref 8–23)
CALCIUM SERPL-MCNC: 9.5 MG/DL (ref 8.8–10.2)
CHLORIDE SERPL-SCNC: 108 MMOL/L (ref 98–107)
CREAT SERPL-MCNC: 0.7 MG/DL (ref 0.51–0.95)
DEPRECATED HCO3 PLAS-SCNC: 18 MMOL/L (ref 22–29)
GFR SERPL CREATININE-BSD FRML MDRD: >90 ML/MIN/1.73M2
GLUCOSE SERPL-MCNC: 80 MG/DL (ref 70–99)
POTASSIUM SERPL-SCNC: 4.4 MMOL/L (ref 3.4–5.3)
SODIUM SERPL-SCNC: 141 MMOL/L (ref 136–145)

## 2022-12-01 PROCEDURE — 99213 OFFICE O/P EST LOW 20 MIN: CPT | Performed by: FAMILY MEDICINE

## 2022-12-01 PROCEDURE — 80048 BASIC METABOLIC PNL TOTAL CA: CPT | Performed by: FAMILY MEDICINE

## 2022-12-01 PROCEDURE — 36415 COLL VENOUS BLD VENIPUNCTURE: CPT | Performed by: FAMILY MEDICINE

## 2022-12-01 NOTE — PROGRESS NOTES
Assessment:       Lower extremity edema  - Basic metabolic panel  (Ca, Cl, CO2, Creat, Gluc, K, Na, BUN)         Plan:     Patient with trace lower extremity edema bilaterally for the past day without any pain or discomfort.  Given symptoms bilaterally without increased pain or redness, low likelihood of DVT and ultrasound not indicated at this time.  Possibly due to increased salt in her diet yesterday.  We will check a BMP and let her know the results of this when we get it back.  Keep legs elevated.  Consider compression stockings.  Follow-up with PCP if symptoms getting worse or not improving over the next week.        Patient Instructions   Keep your legs elevated.  Consider wearing some compression stockings.  I will let you know the results of your lab work when we get it back, specifically your kidney function.  Follow-up with your primary doctor if your symptoms are getting worse or not improving in 1 week.      Subjective:       67 year old female presents for evaluation of a 1 day history of bilateral lower extremity edema.  She had hip replacement on the left in September 2022 and does have some occasional ankle and lower extremity swelling since this.  She did have some clam chowder yesterday as well as some cold cuts.  She denies any chest pain, heart racing, shortness of breath, palpitations.  She denies any leg pain or redness.    Patient Active Problem List   Diagnosis     Generalized Anxiety Disorder     Acute Post-traumatic Stress Disorder     Esophageal Reflux     Osteopenia     Hiatal hernia     Personal history of malignant neoplasm of breast     GERD (gastroesophageal reflux disease)       Past Medical History:   Diagnosis Date     Anemia      Arthritis      Breast cancer (H) 02/14/2011    left breast     Generalized anxiety disorder      GERD (gastroesophageal reflux disease)      Osteopenia      PONV (postoperative nausea and vomiting)        Past Surgical History:   Procedure Laterality  Date     ARTHROPLASTY HIP Left 2022    Procedure: LEFT TOTAL HIP ARTHROPLASTY;  Surgeon: Ritesh De Paz MD;  Location: [facility] Main OR     BIOPSY BREAST Left      CATARACT EXTRACTION Bilateral      CHOLECYSTECTOMY  1998     COSMETIC SURGERY  10/01/2011    reconstruction of left breast after masectomy     MAMMOPLASTY REDUCTION Right 2011     MASTECTOMY Left 2011    implant left breast       Current Outpatient Medications   Medication     acetaminophen (TYLENOL) 325 MG tablet     aspirin (ASA) 325 MG EC tablet     CALCIUM CARBONATE-VITAMIN D3 ORAL     meloxicam (MOBIC) 15 MG tablet     methocarbamol (ROBAXIN) 500 MG tablet     MULTIVITAMIN ORAL     omeprazole (PRILOSEC) 20 MG DR capsule     oxyCODONE (ROXICODONE) 5 MG tablet     POLYETHYLENE GLYCOL 3350 ORAL     senna-docusate (SENOKOT-S/PERICOLACE) 8.6-50 MG tablet     vitamin E 400 UNIT capsule     No current facility-administered medications for this visit.       Allergies   Allergen Reactions     Erythromycin Base [Erythromycin] Unknown       Family History   Problem Relation Age of Onset     Cerebrovascular Disease Mother      Cancer Father      Lung Cancer Father      Diabetes Brother      Hypertension Brother      Heart Disease Brother      Diabetes Brother      Hypertension Brother      Heart Disease Brother         atrial flutter     Arthritis Brother      Breast Cancer Maternal Aunt      Breast Cancer Cousin      Breast Cancer Maternal Aunt        Social History     Socioeconomic History     Marital status: Single     Spouse name: None     Number of children: None     Years of education: None     Highest education level: None   Tobacco Use     Smoking status: Former     Packs/day: 0.50     Years: 15.00     Pack years: 7.50     Types: Cigarettes     Quit date: 12/10/2010     Years since quittin.9     Smokeless tobacco: Never     Tobacco comments:     quit dec 2010   Substance and Sexual Activity     Alcohol use: No      Drug use: No     Sexual activity: Never         Review of Systems  Pertinent items are noted in HPI.      Objective:     /83   Pulse 64   Temp 98.2  F (36.8  C)   Resp 16   Wt 76.2 kg (168 lb)   SpO2 98%   BMI 30.73 kg/m       General appearance: alert, appears stated age and cooperative  Throat: lips, mucosa, and tongue normal; teeth and gums normal  Neck: no adenopathy  Lungs: clear to auscultation bilaterally  Heart: Regular rate and rhythm  Extremities: Trace bilateral lower extremity edema to her shins bilaterally.  There is no redness or significant swelling.  No calf tenderness or swelling.      This note has been dictated using voice recognition software. Any grammatical or context distortions are unintentional and inherent to the software

## 2022-12-01 NOTE — TELEPHONE ENCOUNTER
We do not have any appointments available in the clinic, patient agrees to going to the walk in clinic

## 2022-12-01 NOTE — TELEPHONE ENCOUNTER
Calling in about some swelling concerns.    After hip surgery is having some left ankle/lower leg swelling.     Some mild swelling also in right ankle.     About 24 hours.    No redness. No pain. No fevers.     New onset.    Can be reached at [phone] with what to do next.    Would like Dr Solis's advice.    Thank you

## 2022-12-01 NOTE — TELEPHONE ENCOUNTER
Needs to be seen for evaluation.  I recommend walk-in clinic if no appointments available.  Needs to be evaluated for possible blood clot in leg

## 2022-12-01 NOTE — PATIENT INSTRUCTIONS
Keep your legs elevated.  Consider wearing some compression stockings.  I will let you know the results of your lab work when we get it back, specifically your kidney function.  Follow-up with your primary doctor if your symptoms are getting worse or not improving in 1 week.

## 2023-03-01 ENCOUNTER — IMMUNIZATION (OUTPATIENT)
Dept: NURSING | Facility: CLINIC | Age: 68
End: 2023-03-01
Payer: COMMERCIAL

## 2023-03-01 PROCEDURE — 0124A COVID-19 VACCINE BIVALENT BOOSTER 12+ (PFIZER): CPT

## 2023-03-01 PROCEDURE — 91312 COVID-19 VACCINE BIVALENT BOOSTER 12+ (PFIZER): CPT

## 2023-07-31 ENCOUNTER — TRANSFERRED RECORDS (OUTPATIENT)
Dept: HEALTH INFORMATION MANAGEMENT | Facility: CLINIC | Age: 68
End: 2023-07-31
Payer: COMMERCIAL

## 2023-08-11 ENCOUNTER — HOSPITAL ENCOUNTER (OUTPATIENT)
Dept: MAMMOGRAPHY | Facility: CLINIC | Age: 68
Discharge: HOME | End: 2023-08-11
Attending: FAMILY MEDICINE | Admitting: FAMILY MEDICINE
Payer: COMMERCIAL

## 2023-08-11 DIAGNOSIS — Z12.31 SCREENING MAMMOGRAM, ENCOUNTER FOR: ICD-10-CM

## 2023-08-11 PROCEDURE — 77067 SCR MAMMO BI INCL CAD: CPT | Mod: 52

## 2023-09-19 ASSESSMENT — ENCOUNTER SYMPTOMS
ARTHRALGIAS: 0
JOINT SWELLING: 0
SORE THROAT: 0
NERVOUS/ANXIOUS: 0
HEMATURIA: 0
ABDOMINAL PAIN: 0
FEVER: 0
DYSURIA: 0
MYALGIAS: 0
PARESTHESIAS: 0
BREAST MASS: 0
CHILLS: 0
COUGH: 0
DIARRHEA: 0
NAUSEA: 0
DIZZINESS: 0
SHORTNESS OF BREATH: 0
PALPITATIONS: 0
WEAKNESS: 0
FREQUENCY: 0
HEARTBURN: 1
EYE PAIN: 0
CONSTIPATION: 0
HEMATOCHEZIA: 0
HEADACHES: 0

## 2023-09-19 ASSESSMENT — ACTIVITIES OF DAILY LIVING (ADL): CURRENT_FUNCTION: NO ASSISTANCE NEEDED

## 2023-09-26 ENCOUNTER — OFFICE VISIT (OUTPATIENT)
Dept: FAMILY MEDICINE | Facility: CLINIC | Age: 68
End: 2023-09-26
Payer: COMMERCIAL

## 2023-09-26 VITALS
SYSTOLIC BLOOD PRESSURE: 108 MMHG | HEIGHT: 62 IN | WEIGHT: 176.9 LBS | BODY MASS INDEX: 32.55 KG/M2 | OXYGEN SATURATION: 100 % | RESPIRATION RATE: 12 BRPM | DIASTOLIC BLOOD PRESSURE: 73 MMHG | TEMPERATURE: 98.6 F | HEART RATE: 64 BPM

## 2023-09-26 DIAGNOSIS — K21.9 GASTROESOPHAGEAL REFLUX DISEASE, UNSPECIFIED WHETHER ESOPHAGITIS PRESENT: ICD-10-CM

## 2023-09-26 DIAGNOSIS — E66.09 CLASS 1 OBESITY DUE TO EXCESS CALORIES WITHOUT SERIOUS COMORBIDITY WITH BODY MASS INDEX (BMI) OF 32.0 TO 32.9 IN ADULT: ICD-10-CM

## 2023-09-26 DIAGNOSIS — Z13.1 DIABETES MELLITUS SCREENING: ICD-10-CM

## 2023-09-26 DIAGNOSIS — Z00.00 ENCOUNTER FOR MEDICARE ANNUAL WELLNESS EXAM: Primary | ICD-10-CM

## 2023-09-26 DIAGNOSIS — R00.2 PALPITATIONS: ICD-10-CM

## 2023-09-26 DIAGNOSIS — E66.811 CLASS 1 OBESITY DUE TO EXCESS CALORIES WITHOUT SERIOUS COMORBIDITY WITH BODY MASS INDEX (BMI) OF 32.0 TO 32.9 IN ADULT: ICD-10-CM

## 2023-09-26 DIAGNOSIS — N95.1 MENOPAUSAL SYNDROME (HOT FLASHES): ICD-10-CM

## 2023-09-26 DIAGNOSIS — K59.09 CHRONIC CONSTIPATION: ICD-10-CM

## 2023-09-26 DIAGNOSIS — E78.2 MIXED HYPERLIPIDEMIA: ICD-10-CM

## 2023-09-26 DIAGNOSIS — Z85.3 PERSONAL HISTORY OF MALIGNANT NEOPLASM OF BREAST: ICD-10-CM

## 2023-09-26 PROBLEM — K25.9 GASTRIC ULCER: Status: ACTIVE | Noted: 2023-09-26

## 2023-09-26 LAB
ALBUMIN SERPL BCG-MCNC: 4.7 G/DL (ref 3.5–5.2)
ALBUMIN UR-MCNC: NEGATIVE MG/DL
ALP SERPL-CCNC: 66 U/L (ref 35–104)
ALT SERPL W P-5'-P-CCNC: 20 U/L (ref 0–50)
ANION GAP SERPL CALCULATED.3IONS-SCNC: 10 MMOL/L (ref 7–15)
APPEARANCE UR: CLEAR
AST SERPL W P-5'-P-CCNC: 29 U/L (ref 0–45)
BILIRUB SERPL-MCNC: 0.4 MG/DL
BILIRUB UR QL STRIP: NEGATIVE
BUN SERPL-MCNC: 12.7 MG/DL (ref 8–23)
CALCIUM SERPL-MCNC: 9.7 MG/DL (ref 8.8–10.2)
CHLORIDE SERPL-SCNC: 105 MMOL/L (ref 98–107)
CHOLEST SERPL-MCNC: 198 MG/DL
COLOR UR AUTO: YELLOW
CREAT SERPL-MCNC: 0.88 MG/DL (ref 0.51–0.95)
DEPRECATED HCO3 PLAS-SCNC: 24 MMOL/L (ref 22–29)
EGFRCR SERPLBLD CKD-EPI 2021: 72 ML/MIN/1.73M2
ERYTHROCYTE [DISTWIDTH] IN BLOOD BY AUTOMATED COUNT: 17.3 % (ref 10–15)
GLUCOSE SERPL-MCNC: 97 MG/DL (ref 70–99)
GLUCOSE UR STRIP-MCNC: NEGATIVE MG/DL
HBA1C MFR BLD: 5.8 % (ref 0–5.6)
HCT VFR BLD AUTO: 30.1 % (ref 35–47)
HDLC SERPL-MCNC: 67 MG/DL
HGB BLD-MCNC: 8.6 G/DL (ref 11.7–15.7)
HGB UR QL STRIP: NEGATIVE
KETONES UR STRIP-MCNC: NEGATIVE MG/DL
LDLC SERPL CALC-MCNC: 117 MG/DL
LEUKOCYTE ESTERASE UR QL STRIP: NEGATIVE
MAGNESIUM SERPL-MCNC: 2.3 MG/DL (ref 1.7–2.3)
MCH RBC QN AUTO: 19.4 PG (ref 26.5–33)
MCHC RBC AUTO-ENTMCNC: 28.6 G/DL (ref 31.5–36.5)
MCV RBC AUTO: 68 FL (ref 78–100)
NITRATE UR QL: NEGATIVE
NONHDLC SERPL-MCNC: 131 MG/DL
PH UR STRIP: 7 [PH] (ref 5–8)
PLATELET # BLD AUTO: 289 10E3/UL (ref 150–450)
POTASSIUM SERPL-SCNC: 4.8 MMOL/L (ref 3.4–5.3)
PROT SERPL-MCNC: 7 G/DL (ref 6.4–8.3)
RBC # BLD AUTO: 4.43 10E6/UL (ref 3.8–5.2)
SODIUM SERPL-SCNC: 139 MMOL/L (ref 135–145)
SP GR UR STRIP: 1.01 (ref 1–1.03)
TRIGL SERPL-MCNC: 72 MG/DL
TSH SERPL DL<=0.005 MIU/L-ACNC: 1.27 UIU/ML (ref 0.3–4.2)
UROBILINOGEN UR STRIP-ACNC: 0.2 E.U./DL
WBC # BLD AUTO: 4.9 10E3/UL (ref 4–11)

## 2023-09-26 PROCEDURE — 36415 COLL VENOUS BLD VENIPUNCTURE: CPT | Performed by: NURSE PRACTITIONER

## 2023-09-26 PROCEDURE — 99214 OFFICE O/P EST MOD 30 MIN: CPT | Mod: 25 | Performed by: NURSE PRACTITIONER

## 2023-09-26 PROCEDURE — 90471 IMMUNIZATION ADMIN: CPT | Performed by: NURSE PRACTITIONER

## 2023-09-26 PROCEDURE — 85027 COMPLETE CBC AUTOMATED: CPT | Performed by: NURSE PRACTITIONER

## 2023-09-26 PROCEDURE — 83036 HEMOGLOBIN GLYCOSYLATED A1C: CPT | Performed by: NURSE PRACTITIONER

## 2023-09-26 PROCEDURE — 90662 IIV NO PRSV INCREASED AG IM: CPT | Performed by: NURSE PRACTITIONER

## 2023-09-26 PROCEDURE — 80053 COMPREHEN METABOLIC PANEL: CPT | Performed by: NURSE PRACTITIONER

## 2023-09-26 PROCEDURE — 99397 PER PM REEVAL EST PAT 65+ YR: CPT | Mod: 25 | Performed by: NURSE PRACTITIONER

## 2023-09-26 PROCEDURE — 83735 ASSAY OF MAGNESIUM: CPT | Performed by: NURSE PRACTITIONER

## 2023-09-26 PROCEDURE — 80061 LIPID PANEL: CPT | Performed by: NURSE PRACTITIONER

## 2023-09-26 PROCEDURE — 81003 URINALYSIS AUTO W/O SCOPE: CPT | Performed by: NURSE PRACTITIONER

## 2023-09-26 PROCEDURE — 84443 ASSAY THYROID STIM HORMONE: CPT | Performed by: NURSE PRACTITIONER

## 2023-09-26 RX ORDER — GABAPENTIN 100 MG/1
100 CAPSULE ORAL AT BEDTIME
Qty: 30 CAPSULE | Refills: 1 | Status: SHIPPED | OUTPATIENT
Start: 2023-09-26 | End: 2023-11-26

## 2023-09-26 RX ORDER — VITAMIN E (DL,TOCOPHERYL ACET) 22.5 MG/ML
DROPS ORAL
COMMUNITY
End: 2023-09-26

## 2023-09-26 ASSESSMENT — ENCOUNTER SYMPTOMS
PARESTHESIAS: 0
COUGH: 0
HEARTBURN: 1
WEAKNESS: 0
PALPITATIONS: 0
ABDOMINAL PAIN: 0
DIZZINESS: 0
NERVOUS/ANXIOUS: 0
CONSTIPATION: 0
CHILLS: 0
HEMATURIA: 0
DIARRHEA: 0
EYE PAIN: 0
SHORTNESS OF BREATH: 0
SORE THROAT: 0
BREAST MASS: 0
MYALGIAS: 0
DYSURIA: 0
FEVER: 0
FREQUENCY: 0
JOINT SWELLING: 0
HEADACHES: 0
HEMATOCHEZIA: 0
NAUSEA: 0
ARTHRALGIAS: 0

## 2023-09-26 ASSESSMENT — ACTIVITIES OF DAILY LIVING (ADL): CURRENT_FUNCTION: NO ASSISTANCE NEEDED

## 2023-09-26 ASSESSMENT — PAIN SCALES - GENERAL: PAINLEVEL: NO PAIN (0)

## 2023-09-26 NOTE — PROGRESS NOTES
"SUBJECTIVE:   Samir is a 68 year old who presents for Preventive Visit.    Patient is is single.  She has no concerns for STDs.  She does not have children.  She is consuming a healthy diet.  She exercises 3 times per week at [employer] riding bike and strength training.  Patient takes omeprazole for esophageal reflux.  She uses MiraLAX to assist with constipation.  She is concerned of hot flashes which have been intermittent since breast cancer treatment in 2010.  This occurs primarily at bedtime.  She occasionally experiences hot flashes during the day.  Patient has a history of a left breast mastectomy due to breast cancer.  She avoids hormone treatment.  Lastly, patient has been experiencing intermittent palpitations for 15 years.  She feels a fluttering in her chest which lasts for few seconds.  This primarily occurs when she is getting up from the chair.  She occasionally experiences shortness of breath with walking upstairs.  She denies chest pain, chest tightness, edema, orthopnea, syncope.          9/26/2023     8:59 AM   Additional Questions   Roomed by Jeramie       Are you in the first 12 months of your Medicare coverage?  No    Healthy Habits:     In general, how would you rate your overall health?  Good    Frequency of exercise:  2-3 days/week    Duration of exercise:  Less than 15 minutes    Do you usually eat at least 4 servings of fruit and vegetables a day, include whole grains    & fiber and avoid regularly eating high fat or \"junk\" foods?  Yes    Taking medications regularly:  Yes    Medication side effects:  Not applicable    Ability to successfully perform activities of daily living:  No assistance needed    Home Safety:  No safety concerns identified    Hearing Impairment:  No hearing concerns    In the past 6 months, have you been bothered by leaking of urine?  No    In general, how would you rate your overall mental or emotional health?  Excellent    Additional concerns today:  " Yes      Today's PHQ-2 Score:       2023     8:49 AM   PHQ-2 (  [employer])   Q1: Little interest or pleasure in doing things 0   Q2: Feeling down, depressed or hopeless 0   PHQ-2 Score 0   Q1: Little interest or pleasure in doing things Not at all   Q2: Feeling down, depressed or hopeless Not at all   PHQ-2 Score 0           Have you ever done Advance Care Planning? (For example, a Health Directive, POLST, or a discussion with a medical provider or your loved ones about your wishes): No, advance care planning information given to patient to review.  Patient plans to discuss their wishes with loved ones or provider.      Fall risk  Fallen 2 or more times in the past year?: No  Any fall with injury in the past year?: No    Cognitive Screening   1) Repeat 3 items (Leader, Season, Table)    2) Clock draw: NORMAL  3) 3 item recall: Recalls 3 objects  Results: 3 items recalled: COGNITIVE IMPAIRMENT LESS LIKELY    Mini-CogTM Copyright TIGRE Mckeon. Licensed by the author for use in [facility]; reprinted with permission (caron@[facility]). All rights reserved.      Do you have sleep apnea, excessive snoring or daytime drowsiness? : no    Reviewed and updated as needed this visit by clinical staff   Tobacco  Allergies  Meds  Problems  Med Hx  Surg Hx  Fam Hx          Reviewed and updated as needed this visit by Provider   Tobacco  Allergies  Meds  Problems  Med Hx  Surg Hx  Fam Hx         Social History     Tobacco Use    Smoking status: Former     Packs/day: 0.50     Years: 15.00     Pack years: 7.50     Types: Cigarettes     Start date: 1974     Quit date: 12/10/2010     Years since quittin.8     Passive exposure: Past    Smokeless tobacco: Never    Tobacco comments:     quit dec 2010   Substance Use Topics    Alcohol use: No             2023     9:38 AM   Alcohol Use   Prescreen: >3 drinks/day or >7 drinks/week? Not Applicable          No data to display              Do you have a  current opioid prescription? No  Do you use any other controlled substances or medications that are not prescribed by a provider? None      Current providers sharing in care for this patient include:   Patient Care Team:  Angela Basilio APRN CNP as PCP - General  Willy Truong MD as MD (Hematology & Oncology)  Ruthie Solis MD as Assigned PCP    The following health maintenance items are reviewed in Epic and correct as of today:  Health Maintenance   Topic Date Due    ZOSTER IMMUNIZATION (1 of 2) Never done    LUNG CANCER SCREENING  Never done    COVID-19 Vaccine (6 - Pfizer series) 07/01/2023    MEDICARE ANNUAL WELLNESS VISIT  07/07/2023    ANNUAL REVIEW OF HM ORDERS  07/07/2023    INFLUENZA VACCINE (1) 09/01/2023    FALL RISK ASSESSMENT  09/26/2024    DTAP/TDAP/TD IMMUNIZATION (2 - Td or Tdap) 01/19/2025    COLORECTAL CANCER SCREENING  11/11/2026    LIPID  07/07/2027    ADVANCE CARE PLANNING  09/26/2028    DEXA  08/03/2037    HEPATITIS C SCREENING  Completed    PHQ-2 (once per calendar year)  Completed    Pneumococcal Vaccine: 65+ Years  Completed    IPV IMMUNIZATION  Aged Out    HPV IMMUNIZATION  Aged Out    MENINGITIS IMMUNIZATION  Aged Out    MAMMO SCREENING  Discontinued       FHS-7:       9/10/2021     2:51 PM 6/30/2022     8:49 AM 8/10/2022     6:49 AM 9/5/2022    10:21 AM 8/11/2023     7:05 AM 9/19/2023     9:42 AM   Breast CA Risk Assessment (FHS-7)   Did any of your first-degree relatives have breast or ovarian cancer? No Yes No Unknown No No   Did any of your relatives have bilateral breast cancer? No No No No No Yes   Did any man in your family have breast cancer? No No No No No No   Did any woman in your family have breast and ovarian cancer? No Yes No Yes No Yes   Did any woman in your family have breast cancer before age 50 y? No No No No No Yes   Do you have 2 or more relatives with breast and/or ovarian cancer? No Yes Yes Yes Yes No   Do you have 2 or more relatives with breast and/or bowel  "cancer? No Yes Yes Yes Yes No       Mammogram Screening: Recommended mammography every 1-2 years with patient discussion and risk factor consideration  Pertinent mammograms are reviewed under the imaging tab.    Review of Systems   Constitutional:  Negative for chills and fever.   HENT:  Negative for congestion, ear pain, hearing loss and sore throat.    Eyes:  Negative for pain and visual disturbance.   Respiratory:  Negative for cough and shortness of breath.    Cardiovascular:  Negative for chest pain, palpitations and peripheral edema.   Gastrointestinal:  Positive for heartburn. Negative for abdominal pain, constipation, diarrhea, hematochezia and nausea.   Breasts:  Negative for tenderness, breast mass and discharge.   Genitourinary:  Negative for dysuria, frequency, genital sores, hematuria, pelvic pain, urgency, vaginal bleeding and vaginal discharge.   Musculoskeletal:  Negative for arthralgias, joint swelling and myalgias.   Skin:  Negative for rash.   Neurological:  Negative for dizziness, weakness, headaches and paresthesias.   Psychiatric/Behavioral:  Negative for mood changes. The patient is not nervous/anxious.      Constitutional, HEENT, cardiovascular, pulmonary, gi and gu systems are negative, except as otherwise noted.    OBJECTIVE:   /73   Pulse 64   Temp 98.6  F (37  C)   Resp 12   Ht 1.575 m (5' 2\")   Wt 80.2 kg (176 lb 14.4 oz)   SpO2 100%   BMI 32.36 kg/m   Estimated body mass index is 32.36 kg/m  as calculated from the following:    Height as of this encounter: 1.575 m (5' 2\").    Weight as of this encounter: 80.2 kg (176 lb 14.4 oz).  Physical Exam  GENERAL: healthy, alert and no distress  EYES: Eyes grossly normal to inspection, PERRL and conjunctivae and sclerae normal  HENT: ear canals and TM's normal, nose and mouth without ulcers or lesions  NECK: no adenopathy, no asymmetry, masses, or scars and thyroid normal to palpation  RESP: lungs clear to auscultation - no rales, " rhonchi or wheezes  BREAST: deferred  CV: regular rate and rhythm, normal S1 S2, no S3 or S4, no murmur, click or rub, no peripheral edema and peripheral pulses strong  ABDOMEN: soft, nontender, no hepatosplenomegaly, no masses and bowel sounds normal  MS: no gross musculoskeletal defects noted, no edema  SKIN: no suspicious lesions or rashes  NEURO: Normal strength and tone, mentation intact and speech normal  PSYCH: mentation appears normal, affect normal/bright    Diagnostic Test Results:  Labs reviewed in Epic    ASSESSMENT / PLAN:   Encounter for Medicare annual wellness exam  Recommend consuming a healthy diet and exercising.  Influenza vaccine provided.  She declines influenza vaccine.  - CBC with platelets  - TSH with free T4 reflex  - UA Macroscopic with reflex to Microscopic and Culture  - CBC with platelets  - TSH with free T4 reflex  - UA Macroscopic with reflex to Microscopic and Culture    Diabetes mellitus screening  - Hemoglobin A1c  - Hemoglobin A1c    Menopausal syndrome (hot flashes)  Discussed treatment options.  We will start gabapentin nightly.  Educated on its indications and side effects.  She is to avoid taking this with other sedatives.  May increase dose in 3 to 4 days if symptoms persist.  - gabapentin (NEURONTIN) 100 MG capsule  Dispense: 30 capsule; Refill: 1    Palpitations  Offered EKG, chest x-ray, Holter monitor, but she declines.  We will rule out electrolyte imbalance and underlying thyroid disease.  Further plans pending the results.  - Comprehensive metabolic panel  - Magnesium  - Comprehensive metabolic panel  - Magnesium    Mixed hyperlipidemia  We will check lipid cascade.  She is not currently taking medication.  Discussed obtaining cardiac calcium score for risk stratification.  - Lipid panel reflex to direct LDL Fasting  - Lipid panel reflex to direct LDL Fasting    Gastroesophageal reflux disease, unspecified whether esophagitis present  She continues  "omeprazole.    Personal history of malignant neoplasm of breast  Patient is in remission and continues mammograms.    Chronic constipation  She continues MiraLAX.    Class 1 obesity due to excess calories without serious comorbidity with body mass index (BMI) of 32.0 to 32.9 in adult  Recommend consuming a healthy diet and exercising.  This is contributing to hyperlipidemia and GERD.            COUNSELING:  Reviewed preventive health counseling, as reflected in patient instructions       Regular exercise       Healthy diet/nutrition       Vision screening       Hearing screening       Dental care       Osteoporosis prevention/bone health      BMI:   Estimated body mass index is 32.36 kg/m  as calculated from the following:    Height as of this encounter: 1.575 m (5' 2\").    Weight as of this encounter: 80.2 kg (176 lb 14.4 oz).   Weight management plan: Discussed healthy diet and exercise guidelines      She reports that she quit smoking about 12 years ago. Her smoking use included cigarettes. She started smoking about 49 years ago. She has a 7.50 pack-year smoking history. She has been exposed to tobacco smoke. She has never used smokeless tobacco.      Appropriate preventive services were discussed with this patient, including applicable screening as appropriate for cardiovascular disease, diabetes, osteopenia/osteoporosis, and glaucoma.  As appropriate for age/gender, discussed screening for colorectal cancer, prostate cancer, breast cancer, and cervical cancer. Checklist reviewing preventive services available has been given to the patient.    Reviewed patients plan of care and provided an AVS. The Basic Care Plan (routine screening as documented in Health Maintenance) for Samir meets the Care Plan requirement. This Care Plan has been established and reviewed with the Patient.          HARRY Bolden [facility]    Identified Health Risks:  I have reviewed Opioid Use Disorder " and Substance Use Disorder risk factors and made any needed referrals.

## 2023-09-26 NOTE — PATIENT INSTRUCTIONS
Patient Education   Personalized Prevention Plan  You are due for the preventive services outlined below.  Your care team is available to assist you in scheduling these services.  If you have already completed any of these items, please share that information with your care team to update in your medical record.  Health Maintenance Due   Topic Date Due     Zoster (Shingles) Vaccine (1 of 2) Never done     LUNG CANCER SCREENING  Never done     COVID-19 Vaccine (6 - Pfizer series) 07/01/2023     Annual Wellness Visit  07/07/2023     ANNUAL REVIEW OF HM ORDERS  07/07/2023     Flu Vaccine (1) 09/01/2023

## 2023-09-29 ENCOUNTER — TELEPHONE (OUTPATIENT)
Dept: FAMILY MEDICINE | Facility: CLINIC | Age: 68
End: 2023-09-29
Payer: COMMERCIAL

## 2023-10-01 DIAGNOSIS — D50.0 IRON DEFICIENCY ANEMIA DUE TO CHRONIC BLOOD LOSS: Primary | ICD-10-CM

## 2023-10-02 ENCOUNTER — TELEPHONE (OUTPATIENT)
Dept: FAMILY MEDICINE | Facility: CLINIC | Age: 68
End: 2023-10-02
Payer: COMMERCIAL

## 2023-10-02 NOTE — TELEPHONE ENCOUNTER
"See lab result note from the PCP on 10/1/23 to the patient:    \"----- Message from HARRY Bolden CNP sent at 10/1/2023  5:43 PM CDT -----  Please notify the patient that her hemoglobin is low which is suggestive of a possible GI bleed.  I recommend she increase her Omeprazole to 40 mg daily.  I am ordering an endoscopy for further evaluation.  Please help her schedule an appointment with any available provider this week to check iron studies for further evaluation.  I encourage ER or UC if she is having dark stools, abdominal pain, dizziness, etc.  Thanks.\"    Writer called and relayed the above result note to the patient and her brother.    Patient verbalized understanding and agrees with the plan.    Writer assisted the patient with scheduling an appointment with the PCP on 10/5/23, Thursday, at 2:00 pm.    Denies other questions or concerns at this time.    Janae Sifuentes RN, BSN  [facility]            "

## 2023-10-02 NOTE — TELEPHONE ENCOUNTER
----- Message from HARRY Bolden CNP sent at 10/1/2023  5:43 PM CDT -----  Please notify the patient that her hemoglobin is low which is suggestive of a possible GI bleed.  I recommend she increase her Omeprazole to 40 mg daily.  I am ordering an endoscopy for further evaluation.  Please help her schedule an appointment with any available provider this week to check iron studies for further evaluation.  I encourage ER or UC if she is having dark stools, abdominal pain, dizziness, etc.  Thanks.

## 2023-10-05 ENCOUNTER — OFFICE VISIT (OUTPATIENT)
Dept: FAMILY MEDICINE | Facility: CLINIC | Age: 68
End: 2023-10-05
Payer: COMMERCIAL

## 2023-10-05 VITALS
WEIGHT: 181.1 LBS | HEIGHT: 62 IN | HEART RATE: 70 BPM | RESPIRATION RATE: 14 BRPM | OXYGEN SATURATION: 99 % | TEMPERATURE: 98 F | SYSTOLIC BLOOD PRESSURE: 126 MMHG | DIASTOLIC BLOOD PRESSURE: 68 MMHG | BODY MASS INDEX: 33.33 KG/M2

## 2023-10-05 DIAGNOSIS — Z87.11 HISTORY OF GASTRIC ULCER: ICD-10-CM

## 2023-10-05 DIAGNOSIS — K21.9 GASTROESOPHAGEAL REFLUX DISEASE WITHOUT ESOPHAGITIS: ICD-10-CM

## 2023-10-05 DIAGNOSIS — D50.9 IRON DEFICIENCY ANEMIA, UNSPECIFIED IRON DEFICIENCY ANEMIA TYPE: Primary | ICD-10-CM

## 2023-10-05 LAB
BASO+EOS+MONOS # BLD AUTO: ABNORMAL 10*3/UL
BASO+EOS+MONOS NFR BLD AUTO: ABNORMAL %
BASOPHILS # BLD AUTO: 0.1 10E3/UL (ref 0–0.2)
BASOPHILS NFR BLD AUTO: 2 %
EOSINOPHIL # BLD AUTO: 0.1 10E3/UL (ref 0–0.7)
EOSINOPHIL NFR BLD AUTO: 3 %
ERYTHROCYTE [DISTWIDTH] IN BLOOD BY AUTOMATED COUNT: 17.8 % (ref 10–15)
HCT VFR BLD AUTO: 26 % (ref 35–47)
HGB BLD-MCNC: 7.5 G/DL (ref 11.7–15.7)
IMM GRANULOCYTES # BLD: 0 10E3/UL
IMM GRANULOCYTES NFR BLD: 0 %
LYMPHOCYTES # BLD AUTO: 1.2 10E3/UL (ref 0.8–5.3)
LYMPHOCYTES NFR BLD AUTO: 28 %
MCH RBC QN AUTO: 19.1 PG (ref 26.5–33)
MCHC RBC AUTO-ENTMCNC: 28.8 G/DL (ref 31.5–36.5)
MCV RBC AUTO: 66 FL (ref 78–100)
MONOCYTES # BLD AUTO: 0.6 10E3/UL (ref 0–1.3)
MONOCYTES NFR BLD AUTO: 12 %
NEUTROPHILS # BLD AUTO: 2.5 10E3/UL (ref 1.6–8.3)
NEUTROPHILS NFR BLD AUTO: 55 %
PLATELET # BLD AUTO: 169 10E3/UL (ref 150–450)
RBC # BLD AUTO: 3.93 10E6/UL (ref 3.8–5.2)
RETICS # AUTO: 0.08 10E6/UL (ref 0.01–0.11)
RETICS/RBC NFR AUTO: 1.9 % (ref 0.8–2.7)
WBC # BLD AUTO: 4.5 10E3/UL (ref 4–11)

## 2023-10-05 PROCEDURE — 99213 OFFICE O/P EST LOW 20 MIN: CPT | Performed by: NURSE PRACTITIONER

## 2023-10-05 PROCEDURE — 82728 ASSAY OF FERRITIN: CPT | Performed by: NURSE PRACTITIONER

## 2023-10-05 PROCEDURE — 83550 IRON BINDING TEST: CPT | Performed by: NURSE PRACTITIONER

## 2023-10-05 PROCEDURE — 83540 ASSAY OF IRON: CPT | Performed by: NURSE PRACTITIONER

## 2023-10-05 PROCEDURE — 36415 COLL VENOUS BLD VENIPUNCTURE: CPT | Performed by: NURSE PRACTITIONER

## 2023-10-05 PROCEDURE — 85025 COMPLETE CBC W/AUTO DIFF WBC: CPT | Performed by: NURSE PRACTITIONER

## 2023-10-05 PROCEDURE — 99207 BLOOD MORPHOLOGY PATHOLOGIST REVIEW: CPT | Performed by: PATHOLOGY

## 2023-10-05 PROCEDURE — 85045 AUTOMATED RETICULOCYTE COUNT: CPT | Performed by: NURSE PRACTITIONER

## 2023-10-05 RX ORDER — SENNOSIDES 8.6 MG
1 TABLET ORAL DAILY
COMMUNITY

## 2023-10-05 ASSESSMENT — PAIN SCALES - GENERAL: PAINLEVEL: NO PAIN (0)

## 2023-10-05 NOTE — PROGRESS NOTES
Assessment and Plan:       ICD-10-CM    1. Iron deficiency anemia, unspecified iron deficiency anemia type  D50.9 Ferritin     Iron and iron binding capacity     Lab Blood Morphology Pathologist Review     Ferritin     Iron and iron binding capacity     Lab Blood Morphology Pathologist Review      2. Gastroesophageal reflux disease without esophagitis  K21.9       3. History of gastric ulcer  Z87.11         We will check iron levels and peripheral blood smear.  There are concerns regarding a gastrointestinal bleed.  She has increased her omeprazole to 20 mg twice daily.  She has an endoscopy scheduled and follow-up appointment gastroenterology.  Discussed concerning symptoms including black, tarry stools, abdominal pain, unintentional weight loss, night sweats.  Further plans pending the results.  She is content with the plan.    Subjective:     Samir is a 68 year old female presenting to the clinic for anemia.  Patient was seen her for for physical on 9/26/2023 where she was noted to have anemia with a hemoglobin of 8.6.  She has a history of a gastric ulcer.  She has increased her omeprazole to 20 mg twice daily.  She denies abdominal pain or discomfort.  She has a history of constipation, but states she is having normal bowel movements since starting MiraLAX daily.  She denies any blood or mucus in the stool.  She has not had any black, tarry stools.  She has not had unintentional weight loss.  She is now taking gabapentin nightly to assist with hot flashes and she is sleeping through the night.  She consumes a well-rounded diet.  She has a history of iron deficiency and was taking an iron supplement, but has not taken a supplement for quite some time.  She is noted to have anemia after hip replacement on 9/26/2022.  She has a known hiatal hernia.  She has an endoscopy scheduled for 10/19/2023 and a follow-up gastroenterology appointment on 10/25/2023.    [employer] Systems: A complete 14 point review of  systems was obtained and is negative or as stated in the history of present illness.    Social History     Socioeconomic History    Marital status: Single     Spouse name: Not on file    Number of children: Not on file    Years of education: Not on file    Highest education level: Not on file   Occupational History    Not on file   Tobacco Use    Smoking status: Former     Packs/day: 0.50     Years: 15.00     Pack years: 7.50     Types: Cigarettes     Start date: 1974     Quit date: 12/10/2010     Years since quittin.8     Passive exposure: Past    Smokeless tobacco: Never    Tobacco comments:     quit dec 2010   Vaping Use    Vaping Use: Never used   Substance and Sexual Activity    Alcohol use: No    Drug use: No    Sexual activity: Never   Other Topics Concern    Parent/sibling w/ CABG, MI or angioplasty before 65F 55M? No   Social History Narrative    Not on file     Social Determinants of Health     Financial Resource Strain: Low Risk  (2023)    Financial Resource Strain     Within the past 12 months, have you or your family members you live with been unable to get utilities (heat, electricity) when it was really needed?: No   Food Insecurity: Low Risk  (2023)    Food Insecurity     Within the past 12 months, did you worry that your food would run out before you got money to buy more?: No     Within the past 12 months, did the food you bought just not last and you didn t have money to get more?: No   Transportation Needs: Low Risk  (2023)    Transportation Needs     Within the past 12 months, has lack of transportation kept you from medical appointments, getting your medicines, non-medical meetings or appointments, work, or from getting things that you need?: No   Physical Activity: Not on file   Stress: Not on file   Social Connections: Not on file   Interpersonal Safety: Low Risk  (2023)    Interpersonal Safety     Do you feel physically and emotionally safe where you currently  "live?: Yes     Within the past 12 months, have you been hit, slapped, kicked or otherwise physically hurt by someone?: No     Within the past 12 months, have you been humiliated or emotionally abused in other ways by your partner or ex-partner?: No   Housing Stability: Low Risk  (9/26/2023)    Housing Stability     Do you have housing? : Yes     Are you worried about losing your housing?: No       Active Ambulatory Problems     Diagnosis Date Noted    Esophageal Reflux     Osteopenia     Hiatal hernia 08/04/2017    Personal history of malignant neoplasm of breast 10/11/2019    GERD (gastroesophageal reflux disease) 10/11/2019    Chronic constipation 09/26/2023    Diverticular disease of colon 12/23/2008    Diverticular disease of large intestine 11/11/2016    Female climacteric state 09/26/2023    Gastric ulcer 09/26/2023    Hemorrhoids 11/11/2016     Resolved Ambulatory Problems     Diagnosis Date Noted    Generalized anxiety disorder     Acute Post-traumatic Stress Disorder     Malignant neoplasm of breast (female), unspecified site 10/10/2014     Past Medical History:   Diagnosis Date    Anemia     Arthritis     Breast cancer (H) 02/14/2011    Osteopenia     PONV (postoperative nausea and vomiting)        Family History   Problem Relation Age of Onset    Cerebrovascular Disease Mother     Cancer Father     Lung Cancer Father     Other Cancer Father         Lung Cancer    Diabetes Brother     Hypertension Brother     Heart Disease Brother     Diabetes Brother     Hypertension Brother     Heart Disease Brother         atrial flutter    Arthritis Brother     Breast Cancer Maternal Aunt     Breast Cancer Cousin     Breast Cancer Maternal Aunt     Breast Cancer Cousin        Objective:     /68   Pulse 70   Temp 98  F (36.7  C)   Resp 14   Ht 1.575 m (5' 2\")   Wt 82.1 kg (181 lb 1.6 oz)   SpO2 99%   BMI 33.12 kg/m      Patient is alert, in no obvious distress.   Skin: Warm, dry.    Lungs:  Clear to " auscultation. Respirations even and unlabored.  No wheezing or rales noted.   Heart:  Regular rate and rhythm.  No murmurs, S3, S4, gallops, or rubs.    Abdomen: Soft, nontender.  No organomegaly. Bowel sounds normoactive. No guarding or masses noted.         Answers submitted by the patient for this visit:  General Questionnaire (Submitted on 10/2/2023)  Chief Complaint: Chronic problems general questions HPI Form  What is the reason for your visit today? : Follow up on blood test results  How many servings of fruits and vegetables do you eat daily?: 2-3  On average, how many sweetened beverages do you drink each day (Examples: soda, juice, sweet tea, etc.  Do NOT count diet or artificially sweetened beverages)?: 0  How many minutes a day do you exercise enough to make your heart beat faster?: 10 to 19  How many days a week do you exercise enough to make your heart beat faster?: 3 or less  How many days per week do you miss taking your medication?: 0

## 2023-10-06 ENCOUNTER — TELEPHONE (OUTPATIENT)
Dept: FAMILY MEDICINE | Facility: CLINIC | Age: 68
End: 2023-10-06
Payer: COMMERCIAL

## 2023-10-06 DIAGNOSIS — D50.0 IRON DEFICIENCY ANEMIA DUE TO CHRONIC BLOOD LOSS: Primary | ICD-10-CM

## 2023-10-06 LAB
FERRITIN SERPL-MCNC: 6 NG/ML (ref 11–328)
IRON BINDING CAPACITY (ROCHE): 452 UG/DL (ref 240–430)
IRON SATN MFR SERPL: 4 % (ref 15–46)
IRON SERPL-MCNC: 17 UG/DL (ref 37–145)
PATH REPORT.COMMENTS IMP SPEC: NORMAL
PATH REPORT.COMMENTS IMP SPEC: NORMAL
PATH REPORT.FINAL DX SPEC: NORMAL
PATH REPORT.RELEVANT HX SPEC: NORMAL

## 2023-10-06 RX ORDER — FERROUS SULFATE 325(65) MG
TABLET ORAL
Qty: 60 TABLET | Refills: 0 | Status: SHIPPED | OUTPATIENT
Start: 2023-10-06 | End: 2023-11-03

## 2023-10-06 NOTE — TELEPHONE ENCOUNTER
Writer called patient and left message to return call regarding lab results.    If patient returns call please relay message per PCP and route to nurse queue with any questions or concerns.    BASIM Aguayo, RN  [facility]        ----- Message from HARRY Bolden CNP sent at 10/6/2023  3:41 PM CDT -----  Please notify the patient that her iron levels are low.  I sent a prescription for an iron supplement to take twice daily.  She will want to take this with 200 mg of Vitamin C daily to assist with absorption.  She should contact her gastroenterology to see when she needs to discontinue this prior to the endoscopy.  Thanks.

## 2023-10-19 ENCOUNTER — TRANSFERRED RECORDS (OUTPATIENT)
Dept: HEALTH INFORMATION MANAGEMENT | Facility: CLINIC | Age: 68
End: 2023-10-19
Payer: COMMERCIAL

## 2023-10-25 ENCOUNTER — TRANSFERRED RECORDS (OUTPATIENT)
Dept: HEALTH INFORMATION MANAGEMENT | Facility: CLINIC | Age: 68
End: 2023-10-25
Payer: COMMERCIAL

## 2023-11-03 DIAGNOSIS — D50.0 IRON DEFICIENCY ANEMIA DUE TO CHRONIC BLOOD LOSS: ICD-10-CM

## 2023-11-03 RX ORDER — FERROUS SULFATE 325(65) MG
TABLET ORAL
Qty: 60 TABLET | Refills: 0 | Status: SHIPPED | OUTPATIENT
Start: 2023-11-03 | End: 2023-12-18

## 2023-11-03 NOTE — TELEPHONE ENCOUNTER
Pending Prescriptions:                       Disp   Refills    ferrous sulfate (FEROSUL) 325 (65 Fe) MG *60 tab*0            Sig: Take one tablet by mouth twice daily.    Pt is wondering if Angela wants her to continue taking.

## 2023-11-06 NOTE — TELEPHONE ENCOUNTER
Informed pt of info and she verbalized understanding. Patient stated that she will reach out to the GI provider tomorrow.    ISAAC CoonN, RN  [facility]

## 2023-11-06 NOTE — TELEPHONE ENCOUNTER
Pt calling back to check status, states her results are also in for endoscopy, so she would like to know if based on that, if she needs to continue taking this?

## 2023-11-06 NOTE — TELEPHONE ENCOUNTER
I recommend she reach out to her gastroenterologist regarding guidance based off of the results.  Thanks.

## 2023-11-06 NOTE — TELEPHONE ENCOUNTER
Informed pt of provider's message and pt stated taht the iron pill was prescribed by provider Angela. Patient stated that she does not have a gastroenterologist that she sees. She saw one once but it was just for her endoscopy.    She reached out to provider Angela since the iron pill was started by Angela. Routing to PCP to review and advise.    ISAAC CoonN, RN  [facility]     Principal Discharge DX:	Arm pain   1

## 2023-11-26 DIAGNOSIS — N95.1 MENOPAUSAL SYNDROME (HOT FLASHES): ICD-10-CM

## 2023-11-26 RX ORDER — GABAPENTIN 100 MG/1
CAPSULE ORAL
Qty: 30 CAPSULE | Refills: 1 | Status: SHIPPED | OUTPATIENT
Start: 2023-11-26 | End: 2024-01-17

## 2023-11-30 ENCOUNTER — IMMUNIZATION (OUTPATIENT)
Dept: NURSING | Facility: CLINIC | Age: 68
End: 2023-11-30
Payer: COMMERCIAL

## 2023-11-30 PROCEDURE — 90480 ADMN SARSCOV2 VAC 1/ONLY CMP: CPT

## 2023-11-30 PROCEDURE — 91320 SARSCV2 VAC 30MCG TRS-SUC IM: CPT

## 2023-12-15 ENCOUNTER — TRANSFERRED RECORDS (OUTPATIENT)
Dept: HEALTH INFORMATION MANAGEMENT | Facility: CLINIC | Age: 68
End: 2023-12-15
Payer: COMMERCIAL

## 2023-12-15 DIAGNOSIS — D50.0 IRON DEFICIENCY ANEMIA DUE TO CHRONIC BLOOD LOSS: ICD-10-CM

## 2023-12-18 RX ORDER — FERROUS SULFATE 325(65) MG
TABLET ORAL
Qty: 60 TABLET | Refills: 0 | Status: SHIPPED | OUTPATIENT
Start: 2023-12-18 | End: 2024-01-15

## 2024-01-15 DIAGNOSIS — D50.0 IRON DEFICIENCY ANEMIA DUE TO CHRONIC BLOOD LOSS: ICD-10-CM

## 2024-01-15 RX ORDER — FERROUS SULFATE 325(65) MG
TABLET ORAL
Qty: 60 TABLET | Refills: 4 | Status: SHIPPED | OUTPATIENT
Start: 2024-01-15

## 2024-01-16 ENCOUNTER — TELEPHONE (OUTPATIENT)
Dept: FAMILY MEDICINE | Facility: CLINIC | Age: 69
End: 2024-01-16
Payer: COMMERCIAL

## 2024-01-16 DIAGNOSIS — N95.1 MENOPAUSAL SYNDROME (HOT FLASHES): ICD-10-CM

## 2024-01-16 NOTE — TELEPHONE ENCOUNTER
Pt states is now taking 2 at bedtime    Refill Request  Medication name: Gabapentin 100mg capsule   2 at bedtime    Requested Pharmacy:  [facility], [place] [address] [place] [address] [address] [place] [address]

## 2024-01-17 RX ORDER — GABAPENTIN 100 MG/1
200 CAPSULE ORAL AT BEDTIME
Qty: 180 CAPSULE | Refills: 1 | Status: SHIPPED | OUTPATIENT
Start: 2024-01-17 | End: 2024-07-19

## 2024-01-17 NOTE — TELEPHONE ENCOUNTER
Informed pt of info and she verbalized understanding. No other questions at this time.    ISAAC CoonN, RN  [facility]

## 2024-01-23 ENCOUNTER — OFFICE VISIT (OUTPATIENT)
Dept: FAMILY MEDICINE | Facility: CLINIC | Age: 69
End: 2024-01-23
Payer: COMMERCIAL

## 2024-01-23 VITALS
OXYGEN SATURATION: 98 % | HEART RATE: 60 BPM | SYSTOLIC BLOOD PRESSURE: 120 MMHG | BODY MASS INDEX: 33.72 KG/M2 | HEIGHT: 62 IN | WEIGHT: 183.25 LBS | DIASTOLIC BLOOD PRESSURE: 70 MMHG | TEMPERATURE: 97.2 F

## 2024-01-23 DIAGNOSIS — K44.9 HIATAL HERNIA: ICD-10-CM

## 2024-01-23 DIAGNOSIS — K21.9 GASTROESOPHAGEAL REFLUX DISEASE WITHOUT ESOPHAGITIS: ICD-10-CM

## 2024-01-23 DIAGNOSIS — Z01.818 PREOP GENERAL PHYSICAL EXAM: Primary | ICD-10-CM

## 2024-01-23 DIAGNOSIS — D50.0 IRON DEFICIENCY ANEMIA DUE TO CHRONIC BLOOD LOSS: ICD-10-CM

## 2024-01-23 LAB
ANION GAP SERPL CALCULATED.3IONS-SCNC: 11 MMOL/L (ref 7–15)
BUN SERPL-MCNC: 12.4 MG/DL (ref 8–23)
CALCIUM SERPL-MCNC: 9.6 MG/DL (ref 8.8–10.2)
CHLORIDE SERPL-SCNC: 101 MMOL/L (ref 98–107)
CREAT SERPL-MCNC: 0.66 MG/DL (ref 0.51–0.95)
DEPRECATED HCO3 PLAS-SCNC: 24 MMOL/L (ref 22–29)
EGFRCR SERPLBLD CKD-EPI 2021: >90 ML/MIN/1.73M2
ERYTHROCYTE [DISTWIDTH] IN BLOOD BY AUTOMATED COUNT: 12.7 % (ref 10–15)
GLUCOSE SERPL-MCNC: 85 MG/DL (ref 70–99)
HCT VFR BLD AUTO: 45.1 % (ref 35–47)
HGB BLD-MCNC: 15.1 G/DL (ref 11.7–15.7)
MCH RBC QN AUTO: 29.2 PG (ref 26.5–33)
MCHC RBC AUTO-ENTMCNC: 33.5 G/DL (ref 31.5–36.5)
MCV RBC AUTO: 87 FL (ref 78–100)
PLATELET # BLD AUTO: 187 10E3/UL (ref 150–450)
POTASSIUM SERPL-SCNC: 4.1 MMOL/L (ref 3.4–5.3)
RBC # BLD AUTO: 5.18 10E6/UL (ref 3.8–5.2)
SODIUM SERPL-SCNC: 136 MMOL/L (ref 135–145)
WBC # BLD AUTO: 5.2 10E3/UL (ref 4–11)

## 2024-01-23 PROCEDURE — 99214 OFFICE O/P EST MOD 30 MIN: CPT | Performed by: STUDENT IN AN ORGANIZED HEALTH CARE EDUCATION/TRAINING PROGRAM

## 2024-01-23 PROCEDURE — 85027 COMPLETE CBC AUTOMATED: CPT | Performed by: STUDENT IN AN ORGANIZED HEALTH CARE EDUCATION/TRAINING PROGRAM

## 2024-01-23 PROCEDURE — 36415 COLL VENOUS BLD VENIPUNCTURE: CPT | Performed by: STUDENT IN AN ORGANIZED HEALTH CARE EDUCATION/TRAINING PROGRAM

## 2024-01-23 PROCEDURE — 80048 BASIC METABOLIC PNL TOTAL CA: CPT | Performed by: STUDENT IN AN ORGANIZED HEALTH CARE EDUCATION/TRAINING PROGRAM

## 2024-01-23 RX ORDER — VITAMIN B COMPLEX
TABLET ORAL DAILY
COMMUNITY

## 2024-01-23 ASSESSMENT — PAIN SCALES - GENERAL: PAINLEVEL: NO PAIN (0)

## 2024-01-23 NOTE — PROGRESS NOTES
Preoperative Evaluation  [facility]  [address]  [place] [address]  Phone: [phone]  Fax: [phone]  Primary Provider: Angela Basilio  Pre-op Performing Provider: PABLO WILSON  Jan 23, 2024     Samir is a 68 year old, presenting for the following:  Pre-Op Exam    Surgical Information  Surgery/Procedure: Hiatal Hernia Repair + Toupet fundoplication   Surgery Location: [facility]   Surgeon: Dr. KENISHA Lazo  Surgery Date: 01/25/2023  Time of Surgery: 12:30   Where patient plans to recover: At home with family  Fax number for surgical facility: [phone]    Assessment & Plan   68-year-old female with past medical history of GERD and recent presumed iron deficiency anemia secondary to chronic blood loss who presents for preop evaluation to have hiatal hernia repair and fundoplication.  Patient is overall low risk other than recent anemia and her age.  Low cardiac risk by calculator.  I recommended recheck of her blood counts as she has been on iron for several months due to this.  She states she did have this hemoglobin checked at an outside facility recently and was much improved.  Will check a BMP as well.  Did not recommend an EKG given no significant cardiac history.  Intermediate risk procedure.  Patient is approved to proceed with surgery as long as no critical values return on her blood work.    1. Preop general physical exam  - CBC with platelets; Future  - Basic metabolic panel  (Ca, Cl, CO2, Creat, Gluc, K, Na, BUN); Future    2. Hiatal hernia    3. Gastroesophageal reflux disease without esophagitis    4. Iron deficiency anemia due to chronic blood loss    The proposed surgical procedure is considered INTERMEDIATE risk.    Antiplatelet or Anticoagulation Medication Instructions   - Patient is on no antiplatelet or anticoagulation medications.    Additional Medication Instructions  Hold all medications day of  surgery    Recommendation  APPROVAL GIVEN to proceed with proposed procedure pending review of diagnostic evaluation.    Subjective       HPI related to upcoming procedure: Patient with chronic GERD symptoms and associated worsening anemia thought to be iron deficiency due to chronic blood loss over the last couple of months.  Had an endoscopy with [facility] which I cannot see approximately 3 months ago.  Hiatal hernia was seen on this.  Repair was recommended.        1/16/2024    10:12 AM   Preop Questions   1. Have you ever had a heart attack or stroke? No   2. Have you ever had surgery on your heart or blood vessels, such as a stent placement, a coronary artery bypass, or surgery on an artery in your head, neck, heart, or legs? No   3. Do you have chest pain with activity? No   4. Do you have a history of  heart failure? No   5. Do you currently have a cold, bronchitis or symptoms of other infection? No   6. Do you have a cough, shortness of breath, or wheezing? No   7. Do you or anyone in your family have previous history of blood clots? No   8. Do you or does anyone in your family have a serious bleeding problem such as prolonged bleeding following surgeries or cuts? No   9. Have you ever had problems with anemia or been told to take iron pills? YES - Yes as above   10. Have you had any abnormal blood loss such as black, tarry or bloody stools, or abnormal vaginal bleeding? No   11. Have you ever had a blood transfusion? No   12. Are you willing to have a blood transfusion if it is medically needed before, during, or after your surgery? Yes   13. Have you or any of your relatives ever had problems with anesthesia? No   14. Do you have sleep apnea, excessive snoring or daytime drowsiness? No   15. Do you have any artifical heart valves or other implanted medical devices like a pacemaker, defibrillator, or continuous glucose monitor? No   16. Do you have artificial joints? YES - Left hip replacement   17. Are  you allergic to latex? No       Health Care Directive  Patient does not have a Health Care Directive or Living Will: Discussed advance care planning with patient; however, patient declined at this time.    Preoperative Review of    reviewed - no record of controlled substances prescribed.      Patient Active Problem List    Diagnosis Date Noted    Chronic constipation 09/26/2023     Priority: Medium    Female climacteric state 09/26/2023     Priority: Medium    Gastric ulcer 09/26/2023     Priority: Medium    Personal history of malignant neoplasm of breast 10/11/2019     Priority: Medium    GERD (gastroesophageal reflux disease) 10/11/2019     Priority: Medium    Hiatal hernia 08/04/2017     Priority: Medium    Diverticular disease of large intestine 11/11/2016     Priority: Medium    Hemorrhoids 11/11/2016     Priority: Medium    Osteopenia      Priority: Medium     Created by Conversion  Replacement Utility updated for latest IMO load        Esophageal Reflux      Priority: Medium     Created by Conversion        Diverticular disease of colon 12/23/2008     Priority: Medium      Past Medical History:   Diagnosis Date    Anemia     Arthritis     Breast cancer (H) 02/14/2011    left breast    Generalized anxiety disorder     GERD (gastroesophageal reflux disease)     Osteopenia     PONV (postoperative nausea and vomiting)      Past Surgical History:   Procedure Laterality Date    ARTHROPLASTY HIP Left 9/26/2022    Procedure: LEFT TOTAL HIP ARTHROPLASTY;  Surgeon: Ritesh De Paz MD;  Location: [facility] Main OR    BIOPSY BREAST Left     CATARACT EXTRACTION Bilateral 2021    CHOLECYSTECTOMY  01/01/1998    COSMETIC SURGERY  10/01/2011    reconstruction of left breast after masectomy    MAMMOPLASTY REDUCTION Right 01/01/2011    MASTECTOMY Left 01/01/2011    implant left breast     Current Outpatient Medications   Medication Sig Dispense Refill    ferrous sulfate (FEROSUL) 325 (65 Fe) MG tablet TAKE ONE TABLET  "BY MOUTH TWICE A DAY 60 tablet 4    gabapentin (NEURONTIN) 100 MG capsule Take 2 capsules (200 mg) by mouth at bedtime 180 capsule 1    omeprazole (PRILOSEC) 20 MG DR capsule Take 20 mg by mouth daily      POLYETHYLENE GLYCOL 3350 ORAL [POLYETHYLENE GLYCOL 3350 ORAL] Take 17 g by mouth daily.       sennosides (SENOKOT) 8.6 MG tablet Take 1 tablet by mouth daily      Vitamin D3 (VITAMIN D, CHOLECALCIFEROL,) 25 mcg (1000 units) tablet Take by mouth daily         Allergies   Allergen Reactions    Erythromycin Base [Erythromycin] Unknown    Ethanol Nausea and Vomiting        Social History     Tobacco Use    Smoking status: Former     Packs/day: 0.50     Years: 15.00     Additional pack years: 0.00     Total pack years: 7.50     Types: Cigarettes     Start date: 1974     Quit date: 12/10/2010     Years since quittin.1     Passive exposure: Past    Smokeless tobacco: Never    Tobacco comments:     quit dec 2010   Substance Use Topics    Alcohol use: No       History   Drug Use No         Review of Systems    Objective    /70   Pulse 60   Temp 97.2  F (36.2  C)   Ht 1.575 m (5' 2\")   Wt 83.1 kg (183 lb 4 oz)   SpO2 98%   BMI 33.52 kg/m     Estimated body mass index is 33.52 kg/m  as calculated from the following:    Height as of this encounter: 1.575 m (5' 2\").    Weight as of this encounter: 83.1 kg (183 lb 4 oz).  Physical Exam  /70   Pulse 60   Temp 97.2  F (36.2  C)   Ht 1.575 m (5' 2\")   Wt 83.1 kg (183 lb 4 oz)   SpO2 98%   BMI 33.52 kg/m      General appearance: Alert, cooperative, no distress, appears stated age  Head: Normocephalic, atraumatic, without obvious abnormality  Eyes: Pupils equal round, reactive.  Conjunctiva clear.  Nose: Nares normal, no drainage.  Throat: Lips, mucosa, tongue normal mucosa pink and moist  Neck: Supple, symmetric, trachea midline, no adenopathy.  No thyroid enlargement, tenderness or nodules.    Back: Symmetric, no CVA tenderness.  Lungs: Clear to " auscultation bilaterally, no wheezing or crackles present.  Respirations unlabored  Heart: Regular rate and rhythm, normal S1 and S2, no murmur, rub or gallop.  Abdomen: Soft, nontender, nondistended.  Bowel sounds active in all 4 quadrants.  No masses or organomegaly.  Extremities: Extremities normal, atraumatic.  No cyanosis or edema.  Skin: Skin color, texture, turgor normal no rashes or lesions on limited skin exam  Neurologic: CN II through XII intact, normal strength.    Recent Labs   Lab Test 10/05/23  1435 09/26/23  0941 12/01/22  1319 09/27/22  0552 09/26/22  0610   HGB 7.5* 8.6*  --    < > 12.9    289  --   --  178   INR  --   --   --   --  1.00   NA  --  139 141  --   --    POTASSIUM  --  4.8 4.4  --  3.9   CR  --  0.88 0.70  --  0.79   A1C  --  5.8*  --   --   --     < > = values in this interval not displayed.        Diagnostics  Labs pending at this time.  Results will be reviewed when available.   No EKG required, no history of coronary heart disease, significant arrhythmia, peripheral arterial disease or other structural heart disease.    Revised Cardiac Risk Index (RCRI)  The patient has the following serious cardiovascular risks for perioperative complications:   - No serious cardiac risks = 0 points     RCRI Interpretation: 0 points: Class I (very low risk - 0.4% complication rate)       Signed Electronically by: Reji Anderson MD  Copy of this evaluation report is provided to requesting physician.

## 2024-01-24 NOTE — RESULT ENCOUNTER NOTE
Samir  Your results from your recent clinic visit show:  Your BMP was normal with normal electrolytes and kidney function  Your CBC is normal with no anemia or signs of infection seen    If you have more questions please call the clinic at [phone] or send me a [employer] message    Dr. Reji Callejas

## 2024-05-15 ENCOUNTER — TRANSFERRED RECORDS (OUTPATIENT)
Dept: HEALTH INFORMATION MANAGEMENT | Facility: CLINIC | Age: 69
End: 2024-05-15

## 2024-07-18 DIAGNOSIS — N95.1 MENOPAUSAL SYNDROME (HOT FLASHES): ICD-10-CM

## 2024-07-19 RX ORDER — GABAPENTIN 100 MG/1
200 CAPSULE ORAL AT BEDTIME
Qty: 180 CAPSULE | Refills: 0 | Status: SHIPPED | OUTPATIENT
Start: 2024-07-19

## 2024-07-22 NOTE — TELEPHONE ENCOUNTER
Below SCHEDit message was sent to pt.      Hi    Angela Praful sent a refill for your gabapentin (NEURONTIN) 100 MG capsule to your pharmacy. She did also say you are due for a physical/med check end of September.    Please call [phone] for help scheduling this appointment.    Thanks    Kinjal Carmona MA

## 2024-08-13 ENCOUNTER — HOSPITAL ENCOUNTER (OUTPATIENT)
Dept: MAMMOGRAPHY | Facility: CLINIC | Age: 69
Discharge: HOME | End: 2024-08-13
Admitting: NURSE PRACTITIONER
Payer: COMMERCIAL

## 2024-08-13 DIAGNOSIS — Z12.31 VISIT FOR SCREENING MAMMOGRAM: ICD-10-CM

## 2024-08-13 PROCEDURE — 77063 BREAST TOMOSYNTHESIS BI: CPT | Mod: 52

## 2024-08-27 ENCOUNTER — PATIENT OUTREACH (OUTPATIENT)
Dept: CARE COORDINATION | Facility: CLINIC | Age: 69
End: 2024-08-27
Payer: COMMERCIAL

## 2024-09-10 ENCOUNTER — PATIENT OUTREACH (OUTPATIENT)
Dept: CARE COORDINATION | Facility: CLINIC | Age: 69
End: 2024-09-10
Payer: COMMERCIAL

## 2024-10-08 ENCOUNTER — IMMUNIZATION (OUTPATIENT)
Dept: FAMILY MEDICINE | Facility: CLINIC | Age: 69
End: 2024-10-08
Payer: COMMERCIAL

## 2024-10-08 VITALS — TEMPERATURE: 98.8 F

## 2024-10-08 PROCEDURE — 91320 SARSCV2 VAC 30MCG TRS-SUC IM: CPT

## 2024-10-08 PROCEDURE — 90480 ADMN SARSCOV2 VAC 1/ONLY CMP: CPT

## 2024-10-08 PROCEDURE — G0008 ADMIN INFLUENZA VIRUS VAC: HCPCS

## 2024-10-08 PROCEDURE — 90662 IIV NO PRSV INCREASED AG IM: CPT

## 2024-10-08 NOTE — PROGRESS NOTES
Prior to immunization administration, verified patients identity using patient s name and date of birth. Please see Immunization Activity for additional information.     Screening Questionnaire for Adult Immunization    Are you sick today?   No   Do you have allergies to medications, food, a vaccine component or latex?   No   Have you ever had a serious reaction after receiving a vaccination?   No   Do you have a long-term health problem with heart, lung, kidney, or metabolic disease (e.g., diabetes), asthma, a blood disorder, no spleen, complement component deficiency, a cochlear implant, or a spinal fluid leak?  Are you on long-term aspirin therapy?   No   Do you have cancer, leukemia, HIV/AIDS, or any other immune system problem?   No   Do you have a parent, brother, or sister with an immune system problem?   No   In the past 3 months, have you taken medications that affect  your immune system, such as prednisone, other steroids, or anticancer drugs; drugs for the treatment of rheumatoid arthritis, Crohn s disease, or psoriasis; or have you had radiation treatments?   No   Have you had a seizure, or a brain or other nervous system problem?   No   During the past year, have you received a transfusion of blood or blood    products, or been given immune (gamma) globulin or antiviral drug?   No   For women: Are you pregnant or is there a chance you could become       pregnant during the next month?   No   Have you received any vaccinations in the past 4 weeks?   No     Immunization questionnaire answers were all negative.    I have reviewed the following standing orders:   This patient is due and qualifies for the Covid-19 vaccine.     Click here for COVID-19 Standing Order    I have reviewed the vaccines inclusion and exclusion criteria; No concerns regarding eligibility.     This patient is due and qualifies for the Influenza vaccine.    Click here for Influenza Vaccine Standing Order    I have reviewed the  vaccines inclusion and exclusion criteria; No concerns regarding eligibility.     Patient instructed to remain in clinic for 15 minutes afterwards, and to report any adverse reactions.     Screening performed by MACO SALINAS on 10/8/2024 at 10:12 AM.

## 2024-10-15 DIAGNOSIS — N95.1 MENOPAUSAL SYNDROME (HOT FLASHES): ICD-10-CM

## 2024-10-16 RX ORDER — GABAPENTIN 100 MG/1
200 CAPSULE ORAL AT BEDTIME
Qty: 180 CAPSULE | Refills: 0 | Status: SHIPPED | OUTPATIENT
Start: 2024-10-16

## 2024-11-26 PROBLEM — D50.9 IRON DEFICIENCY ANEMIA: Status: ACTIVE | Noted: 2023-12-15

## 2024-11-26 PROBLEM — D64.9 ANEMIA DUE TO UNKNOWN MECHANISM: Status: ACTIVE | Noted: 2023-10-19

## 2024-11-26 PROBLEM — D12.4 BENIGN NEOPLASM OF DESCENDING COLON: Status: ACTIVE | Noted: 2023-12-19

## 2024-11-26 PROBLEM — K63.5 POLYP OF COLON: Status: ACTIVE | Noted: 2023-12-15

## 2024-11-26 RX ORDER — FAMOTIDINE 20 MG/1
1 TABLET, FILM COATED ORAL AT BEDTIME
COMMUNITY
Start: 2024-03-25

## 2024-11-26 RX ORDER — MULTIVIT WITH MINERALS/LUTEIN
TABLET ORAL EVERY 12 HOURS
COMMUNITY
Start: 2023-10-25 | End: 2024-11-29

## 2024-11-26 SDOH — HEALTH STABILITY: PHYSICAL HEALTH: ON AVERAGE, HOW MANY DAYS PER WEEK DO YOU ENGAGE IN MODERATE TO STRENUOUS EXERCISE (LIKE A BRISK WALK)?: 2 DAYS

## 2024-11-26 SDOH — HEALTH STABILITY: PHYSICAL HEALTH: ON AVERAGE, HOW MANY MINUTES DO YOU ENGAGE IN EXERCISE AT THIS LEVEL?: 10 MIN

## 2024-11-26 ASSESSMENT — SOCIAL DETERMINANTS OF HEALTH (SDOH): HOW OFTEN DO YOU GET TOGETHER WITH FRIENDS OR RELATIVES?: ONCE A WEEK

## 2024-12-03 ENCOUNTER — OFFICE VISIT (OUTPATIENT)
Dept: FAMILY MEDICINE | Facility: CLINIC | Age: 69
End: 2024-12-03
Payer: COMMERCIAL

## 2024-12-03 VITALS
HEART RATE: 59 BPM | OXYGEN SATURATION: 98 % | TEMPERATURE: 98 F | SYSTOLIC BLOOD PRESSURE: 125 MMHG | DIASTOLIC BLOOD PRESSURE: 78 MMHG | HEIGHT: 62 IN | WEIGHT: 177 LBS | BODY MASS INDEX: 32.57 KG/M2 | RESPIRATION RATE: 18 BRPM

## 2024-12-03 DIAGNOSIS — Z00.00 ENCOUNTER FOR MEDICARE ANNUAL WELLNESS EXAM: Primary | ICD-10-CM

## 2024-12-03 DIAGNOSIS — Z79.899 MEDICATION MANAGEMENT: ICD-10-CM

## 2024-12-03 DIAGNOSIS — K59.09 CHRONIC CONSTIPATION: ICD-10-CM

## 2024-12-03 DIAGNOSIS — E66.811 CLASS 1 OBESITY WITH SERIOUS COMORBIDITY AND BODY MASS INDEX (BMI) OF 32.0 TO 32.9 IN ADULT, UNSPECIFIED OBESITY TYPE: ICD-10-CM

## 2024-12-03 DIAGNOSIS — R73.03 PREDIABETES: ICD-10-CM

## 2024-12-03 DIAGNOSIS — Z87.891 PERSONAL HISTORY OF TOBACCO USE: ICD-10-CM

## 2024-12-03 DIAGNOSIS — K21.9 GASTROESOPHAGEAL REFLUX DISEASE, UNSPECIFIED WHETHER ESOPHAGITIS PRESENT: ICD-10-CM

## 2024-12-03 DIAGNOSIS — N95.1 MENOPAUSAL SYNDROME (HOT FLASHES): ICD-10-CM

## 2024-12-03 DIAGNOSIS — E78.2 MIXED HYPERLIPIDEMIA: ICD-10-CM

## 2024-12-03 LAB
ALBUMIN UR-MCNC: NEGATIVE MG/DL
APPEARANCE UR: CLEAR
BACTERIA #/AREA URNS HPF: ABNORMAL /HPF
BILIRUB UR QL STRIP: NEGATIVE
COLOR UR AUTO: YELLOW
ERYTHROCYTE [DISTWIDTH] IN BLOOD BY AUTOMATED COUNT: 12.8 % (ref 10–15)
EST. AVERAGE GLUCOSE BLD GHB EST-MCNC: 117 MG/DL
GLUCOSE UR STRIP-MCNC: NEGATIVE MG/DL
HBA1C MFR BLD: 5.7 % (ref 0–5.6)
HCT VFR BLD AUTO: 43.4 % (ref 35–47)
HGB BLD-MCNC: 14.3 G/DL (ref 11.7–15.7)
HGB UR QL STRIP: NEGATIVE
KETONES UR STRIP-MCNC: NEGATIVE MG/DL
LEUKOCYTE ESTERASE UR QL STRIP: ABNORMAL
MCH RBC QN AUTO: 28.7 PG (ref 26.5–33)
MCHC RBC AUTO-ENTMCNC: 32.9 G/DL (ref 31.5–36.5)
MCV RBC AUTO: 87 FL (ref 78–100)
NITRATE UR QL: NEGATIVE
PH UR STRIP: 6 [PH] (ref 5–8)
PLATELET # BLD AUTO: 191 10E3/UL (ref 150–450)
RBC # BLD AUTO: 4.99 10E6/UL (ref 3.8–5.2)
RBC #/AREA URNS AUTO: ABNORMAL /HPF
SP GR UR STRIP: 1.01 (ref 1–1.03)
SQUAMOUS #/AREA URNS AUTO: ABNORMAL /LPF
UROBILINOGEN UR STRIP-ACNC: 0.2 E.U./DL
WBC # BLD AUTO: 5.4 10E3/UL (ref 4–11)
WBC #/AREA URNS AUTO: ABNORMAL /HPF

## 2024-12-03 PROCEDURE — 80053 COMPREHEN METABOLIC PANEL: CPT | Performed by: NURSE PRACTITIONER

## 2024-12-03 PROCEDURE — G0296 VISIT TO DETERM LDCT ELIG: HCPCS | Performed by: NURSE PRACTITIONER

## 2024-12-03 PROCEDURE — 81001 URINALYSIS AUTO W/SCOPE: CPT | Performed by: NURSE PRACTITIONER

## 2024-12-03 PROCEDURE — 80061 LIPID PANEL: CPT | Performed by: NURSE PRACTITIONER

## 2024-12-03 PROCEDURE — 83036 HEMOGLOBIN GLYCOSYLATED A1C: CPT | Performed by: NURSE PRACTITIONER

## 2024-12-03 PROCEDURE — 84443 ASSAY THYROID STIM HORMONE: CPT | Performed by: NURSE PRACTITIONER

## 2024-12-03 PROCEDURE — 99213 OFFICE O/P EST LOW 20 MIN: CPT | Mod: 25 | Performed by: NURSE PRACTITIONER

## 2024-12-03 PROCEDURE — G0402 INITIAL PREVENTIVE EXAM: HCPCS | Performed by: NURSE PRACTITIONER

## 2024-12-03 PROCEDURE — 85027 COMPLETE CBC AUTOMATED: CPT | Performed by: NURSE PRACTITIONER

## 2024-12-03 PROCEDURE — 36415 COLL VENOUS BLD VENIPUNCTURE: CPT | Performed by: NURSE PRACTITIONER

## 2024-12-03 RX ORDER — GABAPENTIN 100 MG/1
200 CAPSULE ORAL AT BEDTIME
Qty: 180 CAPSULE | Refills: 3 | Status: SHIPPED | OUTPATIENT
Start: 2024-12-03

## 2024-12-03 ASSESSMENT — PAIN SCALES - GENERAL: PAINLEVEL_OUTOF10: NO PAIN (0)

## 2024-12-03 NOTE — PROGRESS NOTES
"Preventive Care Visit  [facility]  HARRY Bolden CNP, Family Medicine  Dec 3, 2024      Assessment & Plan     Encounter for Medicare annual wellness exam  Recommend consuming a healthy diet and exercising.  She will consider obtaining the shingles vaccine at the pharmacy.  Low-dose CT scan ordered for lung cancer screening.  She is up-to-date on colorectal cancer screening.  She obtains yearly mammograms.  - CBC with platelets  - TSH with free T4 reflex  - UA Macroscopic with reflex to Microscopic and Culture  - CBC with platelets  - TSH with free T4 reflex  - UA Macroscopic with reflex to Microscopic and Culture    Personal history of tobacco use  - Prof fee: Shared Decision Making for Lung Cancer Screening  - CT Chest Lung Cancer Scrn Low Dose wo    Menopausal syndrome (hot flashes)  This is controlled with gabapentin.  - gabapentin (NEURONTIN) 100 MG capsule  Dispense: 180 capsule; Refill: 3    Mixed hyperlipidemia  Will check lipid cascade.  She is not currently taking medication.  May consider cardiac calcium score for risk stratification if elevated.  - Lipid panel reflex to direct LDL Fasting  - Lipid panel reflex to direct LDL Fasting    Prediabetes  Will check A1c.  - Hemoglobin A1c  - Hemoglobin A1c    Gastroesophageal reflux disease, unspecified whether esophagitis present  Patient is followed by gastroenterology.  She continues omeprazole and famotidine.    Chronic constipation  She continues MiraLAX and Senokot.    Class 1 obesity with serious comorbidity and body mass index (BMI) of 32.0 to 32.9 in adult, unspecified obesity type  Recommend consuming a healthy diet and exercising.  This is contributing to GERD, prediabetes, hyperlipidemia.    Medication management  - Comprehensive metabolic panel  - Comprehensive metabolic panel          BMI  Estimated body mass index is 32.37 kg/m  as calculated from the following:    Height as of this encounter: 1.575 m (5' 2\").    Weight " as of this encounter: 80.3 kg (177 lb).   Weight management plan: Discussed healthy diet and exercise guidelines    Counseling  Appropriate preventive services were addressed with this patient via screening, questionnaire, or discussion as appropriate for fall prevention, nutrition, physical activity, Tobacco-use cessation, social engagement, weight loss and cognition.  Checklist reviewing preventive services available has been given to the patient.  Reviewed patient's diet, addressing concerns and/or questions.   She is at risk for lack of exercise and has been provided with information to increase physical activity for the benefit of her well-being.         Carlton Dawson is a 69 year old, presenting for the following:  Wellness Visit and Weight Problem        12/3/2024     9:47 AM   Additional Questions   Roomed by Kinjal PATTERSON  Patient is is single. She has no concerns for STDs. She does not have children. She is consuming a healthy diet. She exercises at [employer] by using the elliptical and treadmill.  Patient had a hiatal hernia repair, but continues to experience GERD symptoms.  Patient takes omeprazole 20 mg twice daily and famotidine 20 mg twice daily for esophageal reflux. She uses MiraLAX and Senokot to assist with constipation. Patient has a history of a left breast mastectomy due to breast cancer.  She is taking gabapentin 200 mg nightly for hot flashes.  Patient is retired and is doing well.  Overall, she feels well today and has no other concerns.    Health Care Directive  Patient does not have a Health Care Directive: Discussed advance care planning with patient; however, patient declined at this time.      11/26/2024   General Health   How would you rate your overall physical health? Good   Feel stress (tense, anxious, or unable to sleep) Not at all            11/26/2024   Nutrition   Diet: Regular (no restrictions)            11/26/2024   Exercise   Days per week of  moderate/strenous exercise 2 days   Average minutes spent exercising at this level 10 min      (!) EXERCISE CONCERN      11/26/2024   Social Factors   Frequency of gathering with friends or relatives Once a week   Worry food won't last until get money to buy more No   Food not last or not have enough money for food? No   Do you have housing? (Housing is defined as stable permanent housing and does not include staying ouside in a car, in a tent, in an abandoned building, in an overnight shelter, or couch-surfing.) Yes   Are you worried about losing your housing? No   Lack of transportation? No   Unable to get utilities (heat,electricity)? No            12/3/2024   Fall Risk   Fallen 2 or more times in the past year? No    Trouble with walking or balance? No        Patient-reported          11/26/2024   Activities of Daily Living- Home Safety   Needs help with the following daily activites None of the above   Safety concerns in the home None of the above            11/26/2024   Dental   Dentist two times every year? Yes            11/26/2024   Hearing Screening   Hearing concerns? None of the above            11/26/2024   Driving Risk Screening   Patient/family members have concerns about driving No            11/26/2024   General Alertness/Fatigue Screening   Have you been more tired than usual lately? No            11/26/2024   Urinary Incontinence Screening   Bothered by leaking urine in past 6 months No            11/26/2024   TB Screening   Were you born outside of the [place]? No              Today's PHQ-2 Score:       1/23/2024    11:18 AM   PHQ-2 ( 1999 [employer])   Q1: Little interest or pleasure in doing things 0    Q2: Feeling down, depressed or hopeless 0    PHQ-2 Score 0   Q1: Little interest or pleasure in doing things Not at all   Q2: Feeling down, depressed or hopeless Not at all   PHQ-2 Score 0       Patient-reported         11/26/2024   Substance Use   Alcohol more than 3/day or more than 7/wk No   Do you  have a current opioid prescription? No   How severe/bad is pain from 1 to 10? 0/10 (No Pain)   Do you use any other substances recreationally? No        Social History     Tobacco Use    Smoking status: Former     Current packs/day: 0.00     Average packs/day: 0.5 packs/day for 36.9 years (18.5 ttl pk-yrs)     Types: Cigarettes     Start date: 1974     Quit date: 12/10/2010     Years since quittin.9     Passive exposure: Past    Smokeless tobacco: Never    Tobacco comments:     quit dec 2010   Vaping Use    Vaping status: Never Used   Substance Use Topics    Alcohol use: No    Drug use: No           2024   LAST FHS-7 RESULTS   1st degree relative breast or ovarian cancer No   Any relative bilateral breast cancer No   Any male have breast cancer No   Any ONE woman have BOTH breast AND ovarian cancer No   Any woman with breast cancer before 50yrs No   2 or more relatives with breast AND/OR ovarian cancer No   2 or more relatives with breast AND/OR bowel cancer No           Mammogram Screening - Mammogram every 1-2 years updated in Health Maintenance based on mutual decision making      History of abnormal Pap smear: No - age 65 or older with adequate negative prior screening test results (3 consecutive negative cytology results, 2 consecutive negative cotesting results, or 2 consecutive negative HrHPV test results within 10 years, with the most recent test occurring within the recommended screening interval for the test used)        Latest Ref Rng & Units 10/11/2019     9:07 AM   PAP / HPV   PAP Negative for squamous intraepithelial lesion or malignancy. Negative for squamous intraepithelial lesion or malignancy  Electronically signed by Maria Ines Matthesw CT (ASCP) on 10/18/2019 at  2:27 PM      HPV 16 DNA NEG Negative    HPV 18 DNA NEG Negative    Other HR HPV NEG Negative      ASCVD Risk   The 10-year ASCVD risk score (Stephanie ZEPEDA, et al., 2019) is: 6.9%    Values used to calculate the score:       Age: 69 years      Sex: Female      Is Non- : No      Diabetic: No      Tobacco smoker: No      Systolic Blood Pressure: 125 mmHg      Is BP treated: No      HDL Cholesterol: 67 mg/dL      Total Cholesterol: 198 mg/dL          Reviewed and updated as needed this visit by Provider                    Past Medical History:   Diagnosis Date    Anemia     Arthritis     Breast cancer (H) 02/14/2011    left breast    Generalized anxiety disorder     GERD (gastroesophageal reflux disease)     Osteopenia     PONV (postoperative nausea and vomiting)      Past Surgical History:   Procedure Laterality Date    ARTHROPLASTY HIP Left 09/26/2022    Procedure: LEFT TOTAL HIP ARTHROPLASTY;  Surgeon: Ritesh De Paz MD;  Location: [facility] Main OR    BIOPSY BREAST Left     CATARACT EXTRACTION Bilateral 2021    CHOLECYSTECTOMY  01/01/1998    COSMETIC SURGERY  10/01/2011    reconstruction of left breast after masectomy    HIATAL HERNIA REPAIR      MAMMOPLASTY REDUCTION Right 01/01/2011    MASTECTOMY Left 01/01/2011    implant left breast     Current Outpatient Medications   Medication Sig Dispense Refill    famotidine (PEPCID) 20 MG tablet Take 1 tablet by mouth at bedtime.      gabapentin (NEURONTIN) 100 MG capsule Take 2 capsules (200 mg) by mouth at bedtime. 180 capsule 3    omeprazole (PRILOSEC) 20 MG DR capsule Take 20 mg by mouth daily      POLYETHYLENE GLYCOL 3350 ORAL [POLYETHYLENE GLYCOL 3350 ORAL] Take 17 g by mouth daily.       sennosides (SENOKOT) 8.6 MG tablet Take 1 tablet by mouth daily       Current providers sharing in care for this patient include:  Patient Care Team:  Angela Basilio APRN CNP as PCP - General  Willy Truong MD as MD (Hematology & Oncology)  Angela Basilio APRN CNP as Assigned PCP    The following health maintenance items are reviewed in Epic and correct as of today:  Health Maintenance   Topic Date Due    ZOSTER IMMUNIZATION (1 of 2) Never done    LUNG CANCER  "SCREENING  Never done    MEDICARE ANNUAL WELLNESS VISIT  09/26/2024    ANNUAL REVIEW OF HM ORDERS  09/26/2024    DTAP/TDAP/TD IMMUNIZATION (2 - Td or Tdap) 01/19/2025    FALL RISK ASSESSMENT  12/03/2025    GLUCOSE  01/23/2027    LIPID  09/26/2028    COLORECTAL CANCER SCREENING  12/15/2028    ADVANCE CARE PLANNING  01/23/2029    RSV VACCINE (1 - 1-dose 75+ series) 04/23/2031    DEXA  08/03/2037    HEPATITIS C SCREENING  Completed    PHQ-2 (once per calendar year)  Completed    INFLUENZA VACCINE  Completed    Pneumococcal Vaccine: 65+ Years  Completed    COVID-19 Vaccine  Completed    HPV IMMUNIZATION  Aged Out    MENINGITIS IMMUNIZATION  Aged Out    RSV MONOCLONAL ANTIBODY  Aged Out    MAMMO SCREENING  Discontinued         Review of Systems  Constitutional, HEENT, cardiovascular, pulmonary, GI, , musculoskeletal, neuro, skin, endocrine and psych systems are negative, except as otherwise noted.     Objective    Exam  /78   Pulse 59   Temp 98  F (36.7  C)   Resp 18   Ht 1.575 m (5' 2\")   Wt 80.3 kg (177 lb)   SpO2 98%   BMI 32.37 kg/m     Estimated body mass index is 32.37 kg/m  as calculated from the following:    Height as of this encounter: 1.575 m (5' 2\").    Weight as of this encounter: 80.3 kg (177 lb).    Physical Exam  GENERAL: alert and no distress  EYES: Eyes grossly normal to inspection, PERRL and conjunctivae and sclerae normal  HENT: ear canals and TM's normal, nose and mouth without ulcers or lesions  NECK: no adenopathy, no asymmetry, masses, or scars  RESP: lungs clear to auscultation - no rales, rhonchi or wheezes  CV: regular rate and rhythm, normal S1 S2, no S3 or S4, no murmur, click or rub, no peripheral edema  Right breast: no palpable lumps or masses.   ABDOMEN: soft, nontender, no hepatosplenomegaly, no masses and bowel sounds normal  MS: no gross musculoskeletal defects noted, no edema  SKIN: no suspicious lesions or rashes  NEURO: Normal strength and tone, mentation intact and " speech normal  PSYCH: mentation appears normal, affect normal/bright        12/3/2024   Mini Cog   Clock Draw Score 2 Normal   3 Item Recall 3 objects recalled   Mini Cog Total Score 5            Vision Screen  Reason Vision Screen Not Completed: Screening Recommend: Patient/Guardian Declined      Signed Electronically by: HARRY Bolden CNP    Lung Cancer Screening Shared Decision Making Visit     Samir Tomlin, a 69 year old female, is eligible for lung cancer screening    History   Smoking Status    Former    Types: Cigarettes, Cigars, cigarillos or filtered cigars   Smokeless Tobacco    Never       I have discussed with patient the risks and benefits of screening for lung cancer with low-dose CT.     The risks include:    radiation exposure: one low dose chest CT has as much ionizing radiation as about 15 chest x-rays, or 6 months of background radiation living in [place]      false positives: most findings/nodules are NOT cancer, but some might still require additional diagnostic evaluation, including biopsy    over-diagnosis: some slow growing cancers that might never have been clinically significant will be detected and treated unnecessarily     The benefit of early detection of lung cancer is contingent upon adherence to annual screening or more frequent follow up if indicated.     Furthermore, to benefit from screening, Samir must be willing and able to undergo diagnostic procedures, if indicated. Although no specific guide is available for determining severity of comorbidities, it is reasonable to withhold screening in patients who have greater mortality risk from other diseases.     We did discuss that the best way to prevent lung cancer is to not smoke.    Some patients may value a numeric estimation of lung cancer risk when evaluating if lung cancer screening is right for them, here is one calculator:    ShouldIScreen

## 2024-12-03 NOTE — PATIENT INSTRUCTIONS
Lung Cancer Screening   Frequently Asked Questions  If you are at high-risk for lung cancer, getting screened with low-dose computed tomography (LDCT) every year can help save your life. This handout offers answers to some of the most common questions about lung cancer screening. If you have other questions, please call [phone] ([phone]).     What is it?  Lung cancer screening uses special X-ray technology to create an image of your lung tissue. The exam is quick and easy and takes less than 10 seconds. We don t give you any medicine or use any needles. You can eat before and after the exam. You don t need to change your clothes as long as the clothing on your chest doesn t contain metal. But, you do need to be able to hold your breath for at least 6 seconds during the exam.    What is the goal of lung cancer screening?  The goal of lung cancer screening is to save lives. Many times, lung cancer is not found until a person starts having physical symptoms. Lung cancer screening can help detect lung cancer in the earliest stages when it may be easier to treat.    Who should be screened for lung cancer?  We suggest lung cancer screening for anyone who is at high-risk for lung cancer. You are in the high-risk group if you:      are between the ages of 55 and 79, and    have smoked at least 1 pack of cigarettes a day for 20 or more years, and    still smoke or have quit within the past 15 years.    However, if you have a new cough or shortness of breath, you should talk to your doctor before being screened.    Why does it matter if I have symptoms?  Certain symptoms can be a sign that you have a condition in your lungs that should be checked and treated by your doctor. These symptoms include fever, chest pain, a new or changing cough, shortness of breath that you have never felt before, coughing up blood or unexplained weight loss. Having any of these symptoms can greatly affect the results of lung  cancer screening.       Should all smokers get an LDCT lung cancer screening exam?  It depends. Lung cancer screening is for a very specific group of men and women who have a history of heavy smoking over a long period of time (see  Who should be screened for lung cancer  above).  I am in the high-risk group, but have been diagnosed with cancer in the past. Is LDCT lung cancer screening right for me?  In some cases, you should not have LDCT lung screening, such as when your doctor is already following your cancer with CT scan studies. Your doctor will help you decide if LDCT lung screening is right for you.  Do I need to have a screening exam every year?  Yes. If you are in the high-risk group described earlier, you should get an LDCT lung cancer screening exam every year until you are 79, or are no longer willing or able to undergo screening and possible procedures to diagnose and treat lung cancer.  How effective is LDCT at preventing death from lung cancer?  Studies have shown that LDCT lung cancer screening can lower the risk of death from lung cancer by 20 percent in people who are at high-risk.  What are the risks?  There are some risks and limitations of LDCT lung cancer screening. We want to make sure you understand the risks and benefits, so please let us know if you have any questions. Your doctor may want to talk with you more about these risks.    Radiation exposure: As with any exam that uses radiation, there is a very small increased risk of cancer. The amount of radiation in LDCT is small--about the same amount a person would get from a mammogram. Your doctor orders the exam when he or she feels the potential benefits outweigh the risks.    False negatives: No test is perfect, including LDCT. It is possible that you may have a medical condition, including lung cancer, that is not found during your exam. This is called a false negative result.    False positives and more testing: LDCT very often finds  something in the lung that could be cancer, but in fact is not. This is called a false positive result. False positive tests often cause anxiety. To make sure these findings are not cancer, you may need to have more tests. These tests will be done only if you give us permission. Sometimes patients need a treatment that can have side effects, such as a biopsy. For more information on false positives, see  What can I expect from the results?     Findings not related to lung cancer: Your LDCT exam also takes pictures of areas of your body next to your lungs. In a very small number of cases, the CT scan will show an abnormal finding in one of these areas, such as your kidneys, adrenal glands, liver or thyroid. This finding may not be serious, but you may need more tests. Your doctor can help you decide what other tests you may need, if any.  What can I expect from the results?  About 1 out of 4 LDCT exams will find something that may need more tests. Most of the time, these findings are lung nodules. Lung nodules are very small collections of tissue in the lung. These nodules are very common, and the vast majority--more than 97 percent--are not cancer (benign). Most are normal lymph nodes or small areas of scarring from past infections.  But, if a small lung nodule is found to be cancer, the cancer can be cured more than 90 percent of the time. To know if the nodule is cancer, we may need to get more images before your next yearly screening exam. If the nodule has suspicious features (for example, it is large, has an odd shape or grows over time), we will refer you to a specialist for further testing.  Will my doctor also get the results?  Yes. Your doctor will get a copy of your results.  Is it okay to keep smoking now that there s a cancer screening exam?  No. Tobacco is one of the strongest cancer-causing agents. It causes not only lung cancer, but other cancers and cardiovascular (heart) diseases as well. The damage  caused by smoking builds over time. This means that the longer you smoke, the higher your risk of disease. While it is never too late to quit, the sooner you quit, the better.  Where can I find help to quit smoking?  The best way to prevent lung cancer is to stop smoking. If you have already quit smoking, congratulations and keep it up! For help on quitting smoking, please call [employer] at 1-912-QUITNOW (1-706.343.1061) or the American [employer] Society at 1-970.450.9058 to find local resources near you.  One-on-one health coaching:  If you d prefer to work individually with a health care provider on tobacco cessation, we offer:      Medication Therapy Management:  Our specially trained pharmacists work closely with you and your doctor to help you quit smoking.  Call [phone] or [phone] (toll free).

## 2024-12-04 LAB
ALBUMIN SERPL BCG-MCNC: 4.3 G/DL (ref 3.5–5.2)
ALP SERPL-CCNC: 89 U/L (ref 40–150)
ALT SERPL W P-5'-P-CCNC: 49 U/L (ref 0–50)
ANION GAP SERPL CALCULATED.3IONS-SCNC: 11 MMOL/L (ref 7–15)
AST SERPL W P-5'-P-CCNC: 43 U/L (ref 0–45)
BILIRUB SERPL-MCNC: 0.4 MG/DL
BUN SERPL-MCNC: 11.3 MG/DL (ref 8–23)
CALCIUM SERPL-MCNC: 9.4 MG/DL (ref 8.8–10.4)
CHLORIDE SERPL-SCNC: 106 MMOL/L (ref 98–107)
CHOLEST SERPL-MCNC: 196 MG/DL
CREAT SERPL-MCNC: 0.8 MG/DL (ref 0.51–0.95)
EGFRCR SERPLBLD CKD-EPI 2021: 80 ML/MIN/1.73M2
FASTING STATUS PATIENT QL REPORTED: YES
FASTING STATUS PATIENT QL REPORTED: YES
GLUCOSE SERPL-MCNC: 86 MG/DL (ref 70–99)
HCO3 SERPL-SCNC: 22 MMOL/L (ref 22–29)
HDLC SERPL-MCNC: 57 MG/DL
LDLC SERPL CALC-MCNC: 115 MG/DL
NONHDLC SERPL-MCNC: 139 MG/DL
POTASSIUM SERPL-SCNC: 3.9 MMOL/L (ref 3.4–5.3)
PROT SERPL-MCNC: 6.7 G/DL (ref 6.4–8.3)
SODIUM SERPL-SCNC: 139 MMOL/L (ref 135–145)
TRIGL SERPL-MCNC: 121 MG/DL
TSH SERPL DL<=0.005 MIU/L-ACNC: 1.25 UIU/ML (ref 0.3–4.2)

## 2024-12-11 ENCOUNTER — HOSPITAL ENCOUNTER (OUTPATIENT)
Dept: CT IMAGING | Facility: HOSPITAL | Age: 69
Discharge: HOME | End: 2024-12-11
Attending: NURSE PRACTITIONER
Payer: COMMERCIAL

## 2024-12-11 DIAGNOSIS — Z87.891 PERSONAL HISTORY OF TOBACCO USE: ICD-10-CM

## 2024-12-11 PROCEDURE — 71271 CT THORAX LUNG CANCER SCR C-: CPT

## 2024-12-17 ENCOUNTER — OFFICE VISIT (OUTPATIENT)
Dept: FAMILY MEDICINE | Facility: CLINIC | Age: 69
End: 2024-12-17
Payer: COMMERCIAL

## 2024-12-17 VITALS
HEIGHT: 62 IN | TEMPERATURE: 99 F | WEIGHT: 176 LBS | BODY MASS INDEX: 32.39 KG/M2 | RESPIRATION RATE: 18 BRPM | HEART RATE: 60 BPM | OXYGEN SATURATION: 99 % | DIASTOLIC BLOOD PRESSURE: 70 MMHG | SYSTOLIC BLOOD PRESSURE: 123 MMHG

## 2024-12-17 DIAGNOSIS — K21.9 GASTROESOPHAGEAL REFLUX DISEASE, UNSPECIFIED WHETHER ESOPHAGITIS PRESENT: ICD-10-CM

## 2024-12-17 DIAGNOSIS — E66.811 CLASS 1 OBESITY WITHOUT SERIOUS COMORBIDITY WITH BODY MASS INDEX (BMI) OF 32.0 TO 32.9 IN ADULT, UNSPECIFIED OBESITY TYPE: Primary | ICD-10-CM

## 2024-12-17 DIAGNOSIS — R73.03 PREDIABETES: ICD-10-CM

## 2024-12-17 DIAGNOSIS — K44.9 HIATAL HERNIA: ICD-10-CM

## 2024-12-17 PROCEDURE — 99214 OFFICE O/P EST MOD 30 MIN: CPT | Performed by: NURSE PRACTITIONER

## 2024-12-17 ASSESSMENT — PAIN SCALES - GENERAL: PAINLEVEL_OUTOF10: NO PAIN (0)

## 2024-12-17 NOTE — PROGRESS NOTES
HPI:  Samir Tomlin is a 69 year old year old female for weight management.  Weight related co-morbidities include   Patient Active Problem List   Diagnosis    Esophageal Reflux    Osteopenia    Hiatal hernia    Personal history of malignant neoplasm of breast    GERD (gastroesophageal reflux disease)    Chronic constipation    Diverticular disease of colon    Diverticular disease of large intestine    Female climacteric state    Gastric ulcer    Hemorrhoids    Anemia due to unknown mechanism    Benign neoplasm of descending colon    Iron deficiency anemia    Polyp of colon   .  Her BMI is Body mass index is 32.19 kg/m ..      Assessment/ Plan:  Samir is a 69 year old year old female who presents for medical weight management.  Patient has a history of hiatal hernia repair.  She continues to experience GERD symptoms.  She is taking Omeprazole 20 mg twice daily and Pepcid at bedtime.        Class 1 obesity without serious comorbidity with body mass index (BMI) of 32.0 to 32.9 in adult, unspecified obesity type  Patient eats primarily carbohydrates as this settles her stomach.  We discussed creative ways to increase protein intake.  Recommend changing to brown rice or whole-grain noodles/bread.  Patient will consider high-protein oatmeal.  She will substitute milk with [employer] milk which is higher in protein. Discussed integrating protein at lunch.  Recommend basic strength training exercises to build muscle mass.  She is not interested in medication.     Gastroesophageal reflux disease, unspecified whether esophagitis present  Hiatal hernia  She continues omeprazole and famotidine.  I encouraged her to consider seeing [facility] dietitian as well.    Prediabetes  Patient may consider prediabetes classes or Metformin in the future.         Follow up: as needed      Total time spent on the date of this encounter doing: chart review, review of test results, patient visit, physical exam, education, counseling,  developing plan of care and documenting = 31 minutes.         Counseling:  We discussed [facility] Bariatric Basics including:  -eating 3 meals daily  -eating protein first  -eating slowly, chewing food well  -avoiding/limiting calorie containing beverages  -choosing wheat, not white with breads, crackers, pastas, lucy, bagels, tortillas, rice  -limiting carbohydrates in general  -limiting restaurant or cafeteria eating to twice a week or less    We discussed the importance of restorative sleep and stress management in maintaining a healthy weight.    We reviewed medications associated with weight gain.    We discussed insulin resistance and glycemic index as it relates to appetite and weight control.     We discussed the National Weight Control Registry healthy weight maintenance strategies and ways to optimize metabolism.  We discussed the importance of physical activity including cardiovascular and strength training in maintaining a healthier weight and explored viable options.        History Surrouding Consultation:  She has had several past supervised and unsupervised weight loss attempts  Unfortunately there was not durable weight maintenance.  History of bulimia, anorexia, or binge eating disorder? no  If Present has eating disorder been in remission at least 3 years? N/a    Patient perceived barriers to weight loss: always feels hungry, eats throughout the day,     Dietary History  Meals per day: 3  Snacks: 2  Typical Snack: veggie chips, cheese,   Who does the grocery shopping? self  Who does the cooking? Self   A typical meal includes: breakfast butter bread with butter, oatmeal/shredded wheat, yogurt  Lunch is ramen noodles, ice cream   Supper is meat with corn/peas   Sugared beverages: no   Caffeine: 3 cups of coffee with milk   Amount of restaurant eating per week: one/week       Physical Activity Patterns  Current physical activity routine includes: walks the dog  5,000 steps/day     Limitations from  being physically active on a regular basis includes: stomach upset, bilateral hip pain         PMH:  Past Medical History:   Diagnosis Date    Anemia     Arthritis     Breast cancer (H) 02/14/2011    left breast    Generalized anxiety disorder     GERD (gastroesophageal reflux disease)     Osteopenia     PONV (postoperative nausea and vomiting)        Past Surgical Hx:  Past Surgical History:   Procedure Laterality Date    ARTHROPLASTY HIP Left 09/26/2022    Procedure: LEFT TOTAL HIP ARTHROPLASTY;  Surgeon: Ritesh De Paz MD;  Location: [facility] Main OR    BIOPSY BREAST Left     CATARACT EXTRACTION Bilateral 2021    CHOLECYSTECTOMY  01/01/1998    COSMETIC SURGERY  10/01/2011    reconstruction of left breast after masectomy    HIATAL HERNIA REPAIR      MAMMOPLASTY REDUCTION Right 01/01/2011    MASTECTOMY Left 01/01/2011    implant left breast       Medication:  Current Outpatient Medications   Medication Sig Dispense Refill    famotidine (PEPCID) 20 MG tablet Take 1 tablet by mouth at bedtime.      gabapentin (NEURONTIN) 100 MG capsule Take 2 capsules (200 mg) by mouth at bedtime. 180 capsule 3    omeprazole (PRILOSEC) 20 MG DR capsule Take 20 mg by mouth daily      POLYETHYLENE GLYCOL 3350 ORAL [POLYETHYLENE GLYCOL 3350 ORAL] Take 17 g by mouth daily.       sennosides (SENOKOT) 8.6 MG tablet Take 1 tablet by mouth daily       No current facility-administered medications for this visit.       Allergies:Erythromycin base [erythromycin] and Ethanol    Family Hx:  Family History   Problem Relation Age of Onset    Cerebrovascular Disease Mother     Cancer Father     Lung Cancer Father     Other Cancer Father         Lung Cancer    Diabetes Type 2  Brother     Diabetes Brother     Hypertension Brother     Heart Disease Brother     Diabetes Brother     Hypertension Brother     Heart Disease Brother         atrial flutter    Arthritis Brother     Diabetes Type 2  Brother     Breast Cancer Maternal Aunt     Breast  Cancer Maternal Aunt     Breast Cancer Cousin     Breast Cancer Cousin        Social Hx:  Social History     Socioeconomic History    Marital status: Single     Spouse name: Not on file    Number of children: Not on file    Years of education: Not on file    Highest education level: Not on file   Occupational History    Not on file   Tobacco Use    Smoking status: Former     Current packs/day: 0.00     Average packs/day: 1 pack/day for 36.9 years (36.9 ttl pk-yrs)     Types: Cigarettes, Cigars, cigarillos or filtered cigars     Start date: 1974     Quit date: 12/10/2010     Years since quittin.0     Passive exposure: Past    Smokeless tobacco: Never    Tobacco comments:     quit dec 2010   Vaping Use    Vaping status: Never Used   Substance and Sexual Activity    Alcohol use: No    Drug use: No    Sexual activity: Never   Other Topics Concern    Parent/sibling w/ CABG, MI or angioplasty before 65F 55M? No   Social History Narrative    Not on file     Social Drivers of Health     Financial Resource Strain: Low Risk  (2024)    Financial Resource Strain     Within the past 12 months, have you or your family members you live with been unable to get utilities (heat, electricity) when it was really needed?: No   Food Insecurity: Low Risk  (2024)    Food Insecurity     Within the past 12 months, did you worry that your food would run out before you got money to buy more?: No     Within the past 12 months, did the food you bought just not last and you didn t have money to get more?: No   Transportation Needs: Low Risk  (2024)    Transportation Needs     Within the past 12 months, has lack of transportation kept you from medical appointments, getting your medicines, non-medical meetings or appointments, work, or from getting things that you need?: No   Physical Activity: Insufficiently Active (2024)    Exercise Vital Sign     Days of Exercise per Week: 2 days     Minutes of Exercise per  "Session: 10 min   Stress: No Stress Concern Present (11/26/2024)    [employer] [place] [employer] - Occupational Stress Questionnaire     Feeling of Stress : Not at all   Social Connections: Unknown (11/26/2024)    Social Connection and Isolation Panel [NHANES]     Frequency of Communication with Friends and Family: Not on file     Frequency of Social Gatherings with Friends and Family: Once a week     Attends [employer] Services: Not on file     Active Member of Clubs or Organizations: Not on file     Attends Club or Organization Meetings: Not on file     Marital Status: Not on file   Interpersonal Safety: Low Risk  (12/3/2024)    Interpersonal Safety     Do you feel physically and emotionally safe where you currently live?: Yes     Within the past 12 months, have you been hit, slapped, kicked or otherwise physically hurt by someone?: No     Within the past 12 months, have you been humiliated or emotionally abused in other ways by your partner or ex-partner?: No   Housing Stability: Low Risk  (11/26/2024)    Housing Stability     Do you have housing? : Yes     Are you worried about losing your housing?: No       Tobacco Use Hx:  History   Smoking Status    Former    Types: Cigarettes, Cigars, cigarillos or filtered cigars   Smokeless Tobacco    Never       ROS  General  Fatigue: occasionally   Sleep Quality:good  HEENT  Hx of glaucoma: no   Vision changes: no  Cardiovascular  Chest Pain with Exertion: no  Palpitations: no   Hx of heart disease: no  Pulmonary  Shortness of breath at rest: no  Shortness of breath with exertion: no   Snoring: no  Psychiatric  Moods Stable: yes   Endocrine  Polydipsia: no  Personal or family history of medullary thyroid cancer: no  Neurologic:  Hx of seizures: no    History of kidney stones: no  History of kidney disease: no  Current birth control: n/a     /70   Pulse 60   Temp 99  F (37.2  C)   Resp 18   Ht 1.575 m (5' 2\")   Wt 79.8 kg (176 lb)   SpO2 99%   " "BMI 32.19 kg/m    Wt Readings from Last 2 Encounters:   12/17/24 79.8 kg (176 lb)   12/03/24 80.3 kg (177 lb)     Body mass index is 32.19 kg/m .        Physical Exam:      General Appearance:  Alert, cooperative, no distress, appears stated age   HEENT:  Normal.  No acute findings.   Neck: Supple, symmetrical, no adenopathy.   Lungs:   Clear to auscultation bilaterally, respirations unlabored.  No expiratory wheeze or inspiratory crackles noted.   Heart:  Regular rate and rhythm, S1, S2 normal, no murmur, rub or gallop   Abdomen:   Soft, non-tender, positive bowel sounds, no masses, no organomegaly   Extremities: Extremities normal.  No cyanosis or edema   Skin: Warm, dry.  Skin color, texture, turgor normal, no rashes or lesions   Neurologic: Nonfocal.           Labs:    Lab Results   Component Value Date    WBC 5.4 12/03/2024    HGB 14.3 12/03/2024    HCT 43.4 12/03/2024    MCV 87 12/03/2024     12/03/2024     Lab Results   Component Value Date    CHOL 196 12/03/2024    CHOL 198 09/26/2023    CHOL 199 07/07/2022     Lab Results   Component Value Date    HDL 57 12/03/2024    HDL 67 09/26/2023    HDL 61 07/07/2022     No components found for: \"LDLCALC\"  Lab Results   Component Value Date    TRIG 121 12/03/2024    TRIG 72 09/26/2023    TRIG 97 07/07/2022     No results found for: \"CHOLHDL\"  Lab Results   Component Value Date    ALT 49 12/03/2024    AST 43 12/03/2024    ALKPHOS 89 12/03/2024     A1C on 12/3/24 is 5.7%    Answers submitted by the patient for this visit:  General Questionnaire (Submitted on 12/17/2024)  Chief Complaint: Chronic problems general questions HPI Form  What is the reason for your visit today? : discuss weight loss stratgies  How many days per week do you miss taking your medication?: 0  Questionnaire about: Chronic problems general questions HPI Form (Submitted on 12/17/2024)  Chief Complaint: Chronic problems general questions HPI Form    "

## 2025-01-10 ENCOUNTER — TRANSFERRED RECORDS (OUTPATIENT)
Dept: HEALTH INFORMATION MANAGEMENT | Facility: CLINIC | Age: 70
End: 2025-01-10
Payer: COMMERCIAL

## 2025-02-12 ENCOUNTER — TRANSFERRED RECORDS (OUTPATIENT)
Dept: HEALTH INFORMATION MANAGEMENT | Facility: CLINIC | Age: 70
End: 2025-02-12
Payer: COMMERCIAL

## 2025-04-05 ENCOUNTER — HEALTH MAINTENANCE LETTER (OUTPATIENT)
Age: 70
End: 2025-04-05

## 2025-07-09 ENCOUNTER — VIRTUAL VISIT (OUTPATIENT)
Dept: FAMILY MEDICINE | Facility: CLINIC | Age: 70
End: 2025-07-09
Payer: COMMERCIAL

## 2025-07-09 DIAGNOSIS — R73.03 PREDIABETES: ICD-10-CM

## 2025-07-09 DIAGNOSIS — R42 DIZZINESS: Primary | ICD-10-CM

## 2025-07-09 DIAGNOSIS — R61 NIGHT SWEATS: ICD-10-CM

## 2025-07-09 PROCEDURE — 93005 ELECTROCARDIOGRAM TRACING: CPT | Mod: 95 | Performed by: NURSE PRACTITIONER

## 2025-07-09 PROCEDURE — 1126F AMNT PAIN NOTED NONE PRSNT: CPT | Mod: 95 | Performed by: NURSE PRACTITIONER

## 2025-07-09 PROCEDURE — 98006 SYNCH AUDIO-VIDEO EST MOD 30: CPT | Performed by: NURSE PRACTITIONER

## 2025-07-09 RX ORDER — LACTOBACILLUS RHAMNOSUS GG 10B CELL
1 CAPSULE ORAL 2 TIMES DAILY
COMMUNITY

## 2025-07-09 RX ORDER — MECLIZINE HYDROCHLORIDE 25 MG/1
12.5-25 TABLET ORAL 3 TIMES DAILY PRN
Qty: 30 TABLET | Refills: 0 | Status: SHIPPED | OUTPATIENT
Start: 2025-07-09

## 2025-07-09 NOTE — PROGRESS NOTES
"Samir is a 70 year old who is being evaluated via a billable video visit.          Assessment & Plan     Dizziness  Will rule out anemia, thyroid disease, electrolyte imbalance.  Further plans pending the results.  Will obtain EKG due to questionable palpitations and family history of atrial flutter.  Discussed possible eustachian tube dysfunction or benign paroxysmal positional vertigo.  Recommend daily Flonase.  Provided prescription for meclizine 12.5-25 mg 3 times daily as needed. She is to avoid taking this with other sedatives.  - CBC with platelets  - TSH with free T4 reflex  - Comprehensive metabolic panel  - Hemoglobin  - EKG 12-lead, tracing only    Night sweats  Patient is experiencing questionable hot flashes versus night sweats.  I recommend decreasing the temperature in her house at night.  Will rule out anemia, thyroid disease, infection, diabetes, inflammation.  Further plans pending the results.  Patient may consider increasing gabapentin to 300 mg nightly.  If symptoms persist, may consider abdominal and chest CT.  - CBC with platelets  - TSH with free T4 reflex  - Comprehensive metabolic panel  - Hemoglobin  - UA Macroscopic with reflex to Microscopic and Culture  - CRP, inflammation    Prediabetes  She has a history of prediabetes.  Will check A1c.  - Hemoglobin A1c    The longitudinal plan of care for the diagnosis(es)/condition(s) as documented were addressed during this visit. Due to the added complexity in care, I will continue to support Samir in the subsequent management and with ongoing continuity of care.        BMI  Estimated body mass index is 32.19 kg/m  as calculated from the following:    Height as of 12/17/24: 1.575 m (5' 2\").    Weight as of 12/17/24: 79.8 kg (176 lb).   Weight management plan: Discussed healthy diet and exercise guidelines      Subjective   Samir is a 70 year old, presenting for the following health issues:    Patient presents today with concerns of " hot flashes.  Patient is currently taking gabapentin 200 mg daily for hot flashes.  Symptoms had resolved, but patient states the hot flashes returned 1 to 2 weeks ago.  This occurs at approximately 3 to 4 AM in the morning.  She has had drenching night sweats within her upper torso.  She does keep her air conditioner temp at 74 degrees and uses an overhead fan.  She wears like cotton pajamas to bed.  Patient has also experienced intermittent dizziness for the past 1 to 2 weeks.  She had a plugged sensation within her right ear, but that is resolved.  Dizziness occurs at rest while watching TV.  She denies recent travel.  She has had intermittent rhinorrhea.  She has been monitoring her pulse which she feels occasionally races when she is dizzy.  Both her brothers have atrial flutter.  She has not tried any over-the-counter products for symptoms.  Blood pressure has been normal.  Dizziness typically last for an hour.        Menopausal Sx (Hot flashes again dizzy when has hot flashes) and Dizziness      7/9/2025     3:14 PM   Additional Questions   Roomed by [employer] Start Time: 3:31 PM    History of Present Illness       Reason for visit:  Hot flashes and dizziness  Symptoms include:  Hor   She is taking medications regularly.            Review of Systems  Constitutional, HEENT, cardiovascular, pulmonary, GI, , musculoskeletal, neuro, skin, endocrine and psych systems are negative, except as otherwise noted.      Objective    Vitals - Patient Reported  Systolic (Patient Reported): 132  Diastolic (Patient Reported): 83  Pulse (Patient Reported): 74  Pain Score: No Pain (0)        Physical Exam   GENERAL: alert and no distress  EYES: Eyes grossly normal to inspection.  No discharge or erythema, or obvious scleral/conjunctival abnormalities.  RESP: No audible wheeze, cough, or visible cyanosis.    SKIN: Visible skin clear. No significant rash, abnormal pigmentation or lesions.  NEURO: Cranial nerves  grossly intact.  Mentation and speech appropriate for age.  PSYCH: Appropriate affect, tone, and pace of words        Video-Visit Details    Type of service:  Video Visit   Video End Time:3:46 PM  Originating Location (pt. Location): Home    Distant Location (provider location):  On-site  Platform used for Video Visit: June  Signed Electronically by: HARRY Bolden CNP

## 2025-07-14 DIAGNOSIS — M89.9 DISORDER OF BONE AND CARTILAGE: ICD-10-CM

## 2025-07-14 DIAGNOSIS — R61 NIGHT SWEATS: Primary | ICD-10-CM

## 2025-07-14 DIAGNOSIS — D50.9 IRON DEFICIENCY ANEMIA, UNSPECIFIED IRON DEFICIENCY ANEMIA TYPE: ICD-10-CM

## 2025-07-14 DIAGNOSIS — M94.9 DISORDER OF BONE AND CARTILAGE: ICD-10-CM

## 2025-07-14 DIAGNOSIS — R79.82 ELEVATED C-REACTIVE PROTEIN: ICD-10-CM

## 2025-07-16 ENCOUNTER — TELEPHONE (OUTPATIENT)
Dept: FAMILY MEDICINE | Facility: CLINIC | Age: 70
End: 2025-07-16

## 2025-07-16 ENCOUNTER — OFFICE VISIT (OUTPATIENT)
Dept: FAMILY MEDICINE | Facility: CLINIC | Age: 70
End: 2025-07-16
Payer: COMMERCIAL

## 2025-07-16 VITALS
TEMPERATURE: 98.2 F | WEIGHT: 183 LBS | HEART RATE: 57 BPM | HEIGHT: 62 IN | OXYGEN SATURATION: 96 % | BODY MASS INDEX: 33.68 KG/M2 | RESPIRATION RATE: 24 BRPM | SYSTOLIC BLOOD PRESSURE: 126 MMHG | DIASTOLIC BLOOD PRESSURE: 81 MMHG

## 2025-07-16 DIAGNOSIS — R00.1 SINUS BRADYCARDIA: ICD-10-CM

## 2025-07-16 DIAGNOSIS — R61 NIGHT SWEATS: ICD-10-CM

## 2025-07-16 DIAGNOSIS — R74.8 ELEVATED LIVER ENZYMES: ICD-10-CM

## 2025-07-16 DIAGNOSIS — R42 DIZZINESS: Primary | ICD-10-CM

## 2025-07-16 DIAGNOSIS — Z11.59 ENCOUNTER FOR SCREENING FOR OTHER VIRAL DISEASES: ICD-10-CM

## 2025-07-16 DIAGNOSIS — R74.02 ELEVATED LDH: ICD-10-CM

## 2025-07-16 LAB
BASOPHILS # BLD AUTO: 0.1 10E3/UL (ref 0–0.2)
BASOPHILS NFR BLD AUTO: 1 %
EOSINOPHIL # BLD AUTO: 0.2 10E3/UL (ref 0–0.7)
EOSINOPHIL NFR BLD AUTO: 3 %
ERYTHROCYTE [DISTWIDTH] IN BLOOD BY AUTOMATED COUNT: 12.9 % (ref 10–15)
HAV IGM SERPL QL IA: NONREACTIVE
HBV CORE IGM SERPL QL IA: NONREACTIVE
HBV SURFACE AG SERPL QL IA: NONREACTIVE
HCT VFR BLD AUTO: 43.3 % (ref 35–47)
HCV AB SERPL QL IA: NONREACTIVE
HGB BLD-MCNC: 14.1 G/DL (ref 11.7–15.7)
IMM GRANULOCYTES # BLD: 0 10E3/UL
IMM GRANULOCYTES NFR BLD: 0 %
LYMPHOCYTES # BLD AUTO: 1.7 10E3/UL (ref 0.8–5.3)
LYMPHOCYTES NFR BLD AUTO: 28 %
MCH RBC QN AUTO: 28.2 PG (ref 26.5–33)
MCHC RBC AUTO-ENTMCNC: 32.6 G/DL (ref 31.5–36.5)
MCV RBC AUTO: 87 FL (ref 78–100)
MONOCYTES # BLD AUTO: 0.7 10E3/UL (ref 0–1.3)
MONOCYTES NFR BLD AUTO: 10 %
NEUTROPHILS # BLD AUTO: 3.6 10E3/UL (ref 1.6–8.3)
NEUTROPHILS NFR BLD AUTO: 58 %
PLATELET # BLD AUTO: 223 10E3/UL (ref 150–450)
RBC # BLD AUTO: 5 10E6/UL (ref 3.8–5.2)
RETICS # AUTO: 0.1 10E6/UL (ref 0.03–0.1)
RETICS/RBC NFR AUTO: 2.1 % (ref 0.5–2)
TOTAL PROTEIN SERUM FOR ELP: 6.7 G/DL (ref 6.4–8.3)
WBC # BLD AUTO: 6.3 10E3/UL (ref 4–11)

## 2025-07-16 PROCEDURE — 85045 AUTOMATED RETICULOCYTE COUNT: CPT | Performed by: NURSE PRACTITIONER

## 2025-07-16 PROCEDURE — 3074F SYST BP LT 130 MM HG: CPT | Performed by: NURSE PRACTITIONER

## 2025-07-16 PROCEDURE — 99214 OFFICE O/P EST MOD 30 MIN: CPT | Performed by: NURSE PRACTITIONER

## 2025-07-16 PROCEDURE — 84155 ASSAY OF PROTEIN SERUM: CPT | Mod: 59 | Performed by: NURSE PRACTITIONER

## 2025-07-16 PROCEDURE — 36415 COLL VENOUS BLD VENIPUNCTURE: CPT | Performed by: NURSE PRACTITIONER

## 2025-07-16 PROCEDURE — 86140 C-REACTIVE PROTEIN: CPT | Performed by: NURSE PRACTITIONER

## 2025-07-16 PROCEDURE — 82977 ASSAY OF GGT: CPT | Performed by: NURSE PRACTITIONER

## 2025-07-16 PROCEDURE — 99207 BLOOD MORPHOLOGY PATHOLOGIST REVIEW: CPT | Performed by: PATHOLOGY

## 2025-07-16 PROCEDURE — 80074 ACUTE HEPATITIS PANEL: CPT | Performed by: NURSE PRACTITIONER

## 2025-07-16 PROCEDURE — 1126F AMNT PAIN NOTED NONE PRSNT: CPT | Performed by: NURSE PRACTITIONER

## 2025-07-16 PROCEDURE — 3079F DIAST BP 80-89 MM HG: CPT | Performed by: NURSE PRACTITIONER

## 2025-07-16 PROCEDURE — 80076 HEPATIC FUNCTION PANEL: CPT | Performed by: NURSE PRACTITIONER

## 2025-07-16 PROCEDURE — 85025 COMPLETE CBC W/AUTO DIFF WBC: CPT | Performed by: NURSE PRACTITIONER

## 2025-07-16 PROCEDURE — 84165 PROTEIN E-PHORESIS SERUM: CPT | Performed by: PATHOLOGY

## 2025-07-16 ASSESSMENT — PAIN SCALES - GENERAL: PAINLEVEL_OUTOF10: NO PAIN (0)

## 2025-07-16 NOTE — TELEPHONE ENCOUNTER
"Patient called into the clinic, on 7/16/25, to discuss recent lab results.    Writer relayed the lab result note, from the PCP, to the patient, from 7/15/25:    \"Please notify the patient that her liver enzymes are elevated. I would like her to follow-up in clinic to discuss her labs and for a thorough abdominal exam. Please assist her with scheduling an appointment. Okay to use approval req, etc. Thanks.\"    Patient verbalized understanding and agrees with the plan.    Writer assisted the patient with scheduling an in-clinic appointment, with the PCP, today, Wednesday, 7/16/25, at 1:00 pm, to discuss recent liver panel results and next steps.    No other questions or concerns at this time.    Janae Sifuentes RN, BSN  [facility]    "

## 2025-07-16 NOTE — PROGRESS NOTES
Assessment and Plan:       ICD-10-CM    1. Dizziness  R42 Physical Therapy  Referral      2. Elevated liver enzymes  R74.8 Hepatic panel (Albumin, ALT, AST, Bili, Alk Phos, TP)     CRP, inflammation     Acute hepatitis panel     GGT     Hepatic panel (Albumin, ALT, AST, Bili, Alk Phos, TP)     CRP, inflammation     Acute hepatitis panel     GGT      3. Sinus bradycardia  R00.1 Adult Cardiology Eval  Referral      4. Night sweats  R61 Lab Blood Morphology Pathologist Review     Protein electrophoresis     Lab Blood Morphology Pathologist Review     Protein electrophoresis      5. Elevated LDH  R74.02 Lab Blood Morphology Pathologist Review     Protein electrophoresis     Lab Blood Morphology Pathologist Review     Protein electrophoresis      6. Encounter for screening for other viral diseases  Z11.59 Acute hepatitis panel     Acute hepatitis panel        Suspect benign paroxysmal positional vertigo.  Will refer to for vestibular rehab.  Will recheck liver enzymes and CRP to see where these are trending.  Will obtain GGT and rule out hepatitis.  Will refer to cardiology for sinus bradycardia and dizziness.  Due to night sweats and elevated LDH, will obtain peripheral blood smear and protein electrophoresis.  Further plans pending the results.  She is content with the plan.    30 minutes spent by me on the date of the encounter doing chart review, history and exam, documentation and further activities per the note      Subjective:     Samir is a 70 year old female presenting to the clinic for follow-up on virtual appointment.  Patient was seen on 7/9/2025 virtually where she complained of dizziness and night sweats.  She was concerned that the night sweats were hot flashes and have been taking gabapentin which provided minimal relief.  Patient states she wakes up at 4 AM every morning with her upper body drenched in sweat.  She has adjusted the temperature in her house to 72.  She denies recent  cold symptoms or fever.  She has not had any chest pain, chest tightness, shortness of breath.  She denies abdominal pain or discomfort.  Bowel movements are normal without blood or mucus in the stool.  She occasionally takes MiraLAX.  She denies dysuria, hematuria, urinary urgency, urinary frequency.  She does suffer from heartburn and has a history of a hiatal hernia.  She takes omeprazole and 2 Gaviscon daily.  Patient is followed by gastroenterology.  Patient has been experiencing intermittent dizziness with position changes.  She feels dizzy in the morning when she wakes up.  She started Flonase recently which is provided some improvement.  She denies pain or pressure within her ears.  She has had some postnasal drainage.  She has had bilateral tinnitus for multiple years.  Patient followed up in clinic for labs.  Hemogram, TSH, A1c, ferritin, T4 free, urinalysis were unremarkable.  CRP was mildly elevated at 10.20.  CMP showed AST 54, ALT 69.  LDH was elevated at 309.  EKG showed sinus bradycardia 52 bpm. Patient does not consume alcohol.  She has not been taking Tylenol regularly.      Reviewof Systems: A complete 14 point review of systems was obtained and is negative or as stated in the history of present illness.    Social History     Socioeconomic History    Marital status: Single     Spouse name: Not on file    Number of children: Not on file    Years of education: Not on file    Highest education level: Not on file   Occupational History    Not on file   Tobacco Use    Smoking status: Former     Current packs/day: 0.00     Average packs/day: 1 pack/day for 36.9 years (36.9 ttl pk-yrs)     Types: Cigarettes, Cigars, cigarillos or filtered cigars     Start date: 1974     Quit date: 12/10/2010     Years since quittin.6     Passive exposure: Past    Smokeless tobacco: Never    Tobacco comments:     quit dec 2010   Vaping Use    Vaping status: Never Used   Substance and Sexual Activity    Alcohol  use: No    Drug use: No    Sexual activity: Never   Other Topics Concern    Parent/sibling w/ CABG, MI or angioplasty before 65F 55M? No   Social History Narrative    Not on file     Social Drivers of Health     Financial Resource Strain: Low Risk  (11/26/2024)    Financial Resource Strain     Within the past 12 months, have you or your family members you live with been unable to get utilities (heat, electricity) when it was really needed?: No   Food Insecurity: Low Risk  (11/26/2024)    Food Insecurity     Within the past 12 months, did you worry that your food would run out before you got money to buy more?: No     Within the past 12 months, did the food you bought just not last and you didn t have money to get more?: No   Transportation Needs: Low Risk  (11/26/2024)    Transportation Needs     Within the past 12 months, has lack of transportation kept you from medical appointments, getting your medicines, non-medical meetings or appointments, work, or from getting things that you need?: No   Physical Activity: Insufficiently Active (11/26/2024)    Exercise Vital Sign     Days of Exercise per Week: 2 days     Minutes of Exercise per Session: 10 min   Stress: No Stress Concern Present (11/26/2024)    [employer] [place] [employer] - Occupational Stress Questionnaire     Feeling of Stress : Not at all   Social Connections: Unknown (11/26/2024)    Social Connection and Isolation Panel [NHANES]     Frequency of Communication with Friends and Family: Not on file     Frequency of Social Gatherings with Friends and Family: Once a week     Attends Buddhist Services: Not on file     Active Member of Clubs or Organizations: Not on file     Attends Club or Organization Meetings: Not on file     Marital Status: Not on file   Interpersonal Safety: Low Risk  (7/16/2025)    Interpersonal Safety     Do you feel physically and emotionally safe where you currently live?: Yes     Within the past 12 months, have you been  hit, slapped, kicked or otherwise physically hurt by someone?: No     Within the past 12 months, have you been humiliated or emotionally abused in other ways by your partner or ex-partner?: No   Housing Stability: Low Risk  (11/26/2024)    Housing Stability     Do you have housing? : Yes     Are you worried about losing your housing?: No       Active Ambulatory Problems     Diagnosis Date Noted    Esophageal Reflux     Osteopenia     Hiatal hernia 08/04/2017    Personal history of malignant neoplasm of breast 10/11/2019    GERD (gastroesophageal reflux disease) 10/11/2019    Chronic constipation 09/26/2023    Diverticular disease of colon 12/23/2008    Diverticular disease of large intestine 11/11/2016    Female climacteric state 09/26/2023    Gastric ulcer 09/26/2023    Hemorrhoids 11/11/2016    Anemia due to unknown mechanism 10/19/2023    Benign neoplasm of descending colon 12/19/2023    Iron deficiency anemia 12/15/2023    Polyp of colon 12/15/2023     Resolved Ambulatory Problems     Diagnosis Date Noted    Generalized anxiety disorder     Acute Post-traumatic Stress Disorder     Malignant neoplasm of breast (female), unspecified site 10/10/2014     Past Medical History:   Diagnosis Date    Anemia     Arthritis     Breast cancer (H) 02/14/2011    Osteopenia     PONV (postoperative nausea and vomiting)        Family History   Problem Relation Age of Onset    Cerebrovascular Disease Mother     Cancer Father     Lung Cancer Father     Other Cancer Father         Lung Cancer    Diabetes Type 2  Brother     Diabetes Brother     Hypertension Brother     Heart Disease Brother     Diabetes Brother     Hypertension Brother     Heart Disease Brother         atrial flutter    Arthritis Brother     Diabetes Type 2  Brother     Breast Cancer Maternal Aunt     Breast Cancer Maternal Aunt     Breast Cancer Cousin     Breast Cancer Cousin        Objective:     /81   Pulse 57   Temp 98.2  F (36.8  C)   Resp 24   Ht  "1.575 m (5' 2\")   Wt 83 kg (183 lb)   SpO2 96%   Breastfeeding No   BMI 33.47 kg/m      Patient is alert, in no obvious distress.   Skin: Warm, dry.  No lesions or rashes.  Skin turgor rapid return.   HEENT:  Head normocephalic, atraumatic.  Eyes normal.   Ears normal.  Nose patent, mucosa pink.  Oropharynx mucosa pink.  No lesions or tonsillar enlargement.   Neck: Supple, no lymphadenopathy, JVD, bruits noted.  No thyromegaly.  Lungs:  Clear to auscultation. Respirations even and unlabored.  No wheezing or rales noted.   Heart:  Regular rate and rhythm.  No murmurs, S3, S4, gallops, or rubs.    Abdomen: Soft, nontender.  No organomegaly. Bowel sounds normoactive. No guarding or masses noted.   Musculoskeletal:  Full ROM of extremities.  DTRs symmetrical, sensations intact.  No obvious deformity.  Muscle strength equal +5/5.   Neurological:  Cranial nerves 2-12 intact.  Sylwia Hallpike maneuver is positive on the right.           Answers submitted by the patient for this visit:  General Questionnaire (Submitted on 7/9/2025)  Chief Complaint: Chronic problems general questions HPI Form  How many days per week do you miss taking your medication?: 0  General Concern (Submitted on 7/9/2025)  Chief Complaint: Chronic problems general questions HPI Form  What is the reason for your visit today?: Hot flashes and dizziness  What are your symptoms?: Hor  Questionnaire about: Chronic problems general questions HPI Form (Submitted on 7/9/2025)  Chief Complaint: Chronic problems general questions HPI Form    "

## 2025-07-17 ENCOUNTER — PATIENT OUTREACH (OUTPATIENT)
Dept: CARE COORDINATION | Facility: CLINIC | Age: 70
End: 2025-07-17
Payer: COMMERCIAL

## 2025-07-17 LAB
ALBUMIN SERPL BCG-MCNC: 4.3 G/DL (ref 3.5–5.2)
ALBUMIN SERPL ELPH-MCNC: 4.2 G/DL (ref 3.7–5.1)
ALP SERPL-CCNC: 119 U/L (ref 40–150)
ALPHA1 GLOB SERPL ELPH-MCNC: 0.3 G/DL (ref 0.2–0.4)
ALPHA2 GLOB SERPL ELPH-MCNC: 0.7 G/DL (ref 0.5–0.9)
ALT SERPL W P-5'-P-CCNC: 42 U/L (ref 0–50)
AST SERPL W P-5'-P-CCNC: 38 U/L (ref 0–45)
B-GLOBULIN SERPL ELPH-MCNC: 0.7 G/DL (ref 0.6–1)
BILIRUB SERPL-MCNC: 0.2 MG/DL
BILIRUBIN DIRECT (ROCHE PRO & PURE): 0.1 MG/DL (ref 0–0.45)
CRP SERPL-MCNC: <3 MG/L
GAMMA GLOB SERPL ELPH-MCNC: 0.8 G/DL (ref 0.7–1.6)
GGT SERPL-CCNC: 39 U/L (ref 5–36)
M PROTEIN SERPL ELPH-MCNC: 0.2 G/DL
PATH REPORT.COMMENTS IMP SPEC: NORMAL
PATH REPORT.FINAL DX SPEC: NORMAL
PATH REPORT.MICROSCOPIC SPEC OTHER STN: NORMAL
PATH REPORT.RELEVANT HX SPEC: NORMAL
PROT PATTERN SERPL ELPH-IMP: ABNORMAL
PROT SERPL-MCNC: 6.9 G/DL (ref 6.4–8.3)

## 2025-07-22 ENCOUNTER — TELEPHONE (OUTPATIENT)
Dept: FAMILY MEDICINE | Facility: CLINIC | Age: 70
End: 2025-07-22
Payer: COMMERCIAL

## 2025-07-22 DIAGNOSIS — R61 NIGHT SWEATS: ICD-10-CM

## 2025-07-22 DIAGNOSIS — D47.2 MONOCLONAL (M) PROTEIN DISEASE, MULTIPLE 'M' PROTEIN: ICD-10-CM

## 2025-07-22 DIAGNOSIS — R74.02 ELEVATED LDH: Primary | ICD-10-CM

## 2025-07-22 NOTE — TELEPHONE ENCOUNTER
Test Results    Contacts       Contact Date/Time Type Contact Phone/Fax    07/22/2025 10:12 AM CDT Phone (Incoming) Samir Tomlin (Self) [phone] (M)            Who ordered the test:  Angela Basilio    Type of test: Lab    Date of test:  07/16/2025    Where was the test performed:  [facility]    What are your questions/concerns?:  Patient would like a call back to discuss her lab results    Could we send this information to you in [employer] or would you prefer to receive a phone call?:   Patient would prefer a phone call   Okay to leave a detailed message?: Yes at Cell number on file:    Telephone Information:   Mobile [phone]

## 2025-07-22 NOTE — TELEPHONE ENCOUNTER
Spoke with patient to see what questions she was having. Patient is wondering what the abnormal results from 7/16/25 mean. Angela has not noted on those results yet. Informed patient that I would send her message to Angela, and that Angela would write a note on her mychart, or we would call her back.      Yumiko CROFT CMA on July 22, 2025 at 1:14 PM

## 2025-07-23 ENCOUNTER — PATIENT OUTREACH (OUTPATIENT)
Dept: ONCOLOGY | Facility: CLINIC | Age: 70
End: 2025-07-23
Payer: COMMERCIAL

## 2025-07-23 NOTE — PROGRESS NOTES
New Patient Oncology Nurse Navigator Note     Referral Received: 07/23/25      Referring provider:     Angela Basilio APRN CNP     Referring Clinic/Organization: [facility]     Referred to: Benign Hematology    Requested provider (if applicable): First available - did not specify      Evaluation for : MGUS  Diagnosis   R74.02 (ICD-10-CM) - Elevated LDH   R61 (ICD-10-CM) - Night sweats        Clinical History (per Nurse review of records provided):       Latest Reference Range & Units 07/11/25 08:25 07/16/25 13:41   Lactate Dehydrogenase 0 - 250 U/L 309 (H)    WBC 4.0 - 11.0 10e3/uL 5.1 6.3   Hemoglobin 11.7 - 15.7 g/dL 13.4 14.1   Hematocrit 35.0 - 47.0 % 40.3 43.3   Platelet Count 150 - 450 10e3/uL 197 223   RBC Count 3.80 - 5.20 10e6/uL 4.69 5.00   MCV 78 - 100 fL 86 87   MCH 26.5 - 33.0 pg 28.6 28.2   MCHC 31.5 - 36.5 g/dL 33.3 32.6   RDW 10.0 - 15.0 % 12.8 12.9   % Retic 0.5 - 2.0 %  2.1 (H)   Absolute Retic 0.025 - 0.095 10e6/uL  0.103 (H)   Albumin Fraction 3.7 - 5.1 g/dL  4.2   Alpha 1 Fraction 0.2 - 0.4 g/dL  0.3   Alpha 2 Fraction 0.5 - 0.9 g/dL  0.7   Beta Fraction 0.6 - 1.0 g/dL  0.7   ELP Interpretation:   Small monoclonal protein (0.2 g/dL) seen in the gamma fraction, not previously characterized in our laboratory. Recommend serum and urine immunofixation for confirmation and further characterization if not previously performed elsewhere. Pathologic significance requires clinical correlation. Bakari Boateng MD   Gamma Fraction 0.7 - 1.6 g/dL  0.8   Monoclonal Peak <=0.0 g/dL  0.2 (H)   Total Protein Serum for ELP 6.4 - 8.3 g/dL  6.7   (H): Data is abnormally high    Records Location: [facility]     Additional testing needed prior to consult:     ?    Referral updates and Plan:     07/23/2025 8:22 AM -  Referral received and reviewed. Pt aware of referral. Sent to NPS to schedule next available.     Sola Barrett, ISAACN, RN, OCN  Hematology/Oncology Nurse Navigator  [facility]  [facility]  [phone] / [phone]

## 2025-08-15 ENCOUNTER — HOSPITAL ENCOUNTER (OUTPATIENT)
Dept: MAMMOGRAPHY | Facility: CLINIC | Age: 70
Discharge: HOME | End: 2025-08-15
Attending: NURSE PRACTITIONER | Admitting: NURSE PRACTITIONER
Payer: COMMERCIAL

## 2025-08-15 DIAGNOSIS — Z12.31 VISIT FOR SCREENING MAMMOGRAM: ICD-10-CM

## 2025-08-15 PROCEDURE — 77063 BREAST TOMOSYNTHESIS BI: CPT | Mod: 52

## 2025-08-18 ENCOUNTER — THERAPY VISIT (OUTPATIENT)
Dept: PHYSICAL THERAPY | Facility: REHABILITATION | Age: 70
End: 2025-08-18
Attending: NURSE PRACTITIONER
Payer: COMMERCIAL

## 2025-08-18 DIAGNOSIS — R42 DIZZINESS: Primary | ICD-10-CM

## 2025-08-18 DIAGNOSIS — H81.10 BENIGN PAROXYSMAL POSITIONAL VERTIGO, UNSPECIFIED LATERALITY: Primary | ICD-10-CM

## 2025-08-18 PROCEDURE — 97161 PT EVAL LOW COMPLEX 20 MIN: CPT | Mod: GP

## 2025-08-18 PROCEDURE — 95992 CANALITH REPOSITIONING PROC: CPT | Mod: GP

## 2025-10-01 ENCOUNTER — PRE VISIT (OUTPATIENT)
Dept: ONCOLOGY | Facility: HOSPITAL | Age: 70
End: 2025-10-01
Payer: COMMERCIAL

## (undated) DEVICE — PLATE GROUNDING ADULT W/CORD 9165L

## (undated) DEVICE — GLOVE UNDER INDICATOR PI SZ 8.5 LF 41685

## (undated) DEVICE — SU STRATAFIX MONOCRYL 3-0 SPIRAL PS-2 30CM SXMP1B106

## (undated) DEVICE — SOL NACL 0.9% IRRIG 1000ML BOTTLE 2F7124

## (undated) DEVICE — GOWN IMPERVIOUS BREATHABLE 2XL/XLONG

## (undated) DEVICE — SOL WATER IRRIG 1000ML BOTTLE 2F7114

## (undated) DEVICE — BONE CLEANING TIP INTERPULSE  0210-010-000

## (undated) DEVICE — DECANTER VIAL 2006S

## (undated) DEVICE — PILLOW HIP ABDUCTION CONCAVE 1.9

## (undated) DEVICE — SU ETHIBOND 1 CT-1 30" X425H

## (undated) DEVICE — GLOVE BIOGEL INDICATOR 7.5 LF 41675

## (undated) DEVICE — RETRIVER SUTURE LASSO HOFFEE BLUE 710000

## (undated) DEVICE — CUSTOM PACK TOTAL HIP SOP5BTHHEA

## (undated) DEVICE — DRAPE SHEET REV FOLD 3/4 9349

## (undated) DEVICE — DRSG AQUACEL AG HYDROFIBER  3.5X10" 422605

## (undated) DEVICE — SUTURE VICRYL+ 2-0 27IN CT-1 UND VCP259H

## (undated) DEVICE — SU FIBERWIRE 2 38" T-8 NDL  AR-7206

## (undated) DEVICE — GLOVE BIOGEL PI ULTRATOUCH G SZ 7.0 42170

## (undated) DEVICE — DRAPE SLEEVE 599

## (undated) DEVICE — SUCTION MANIFOLD NEPTUNE 2 SYS 4 PORT 0702-020-000

## (undated) DEVICE — GLOVE SURG PI ULTRA TOUCH M SZ 8 LF

## (undated) DEVICE — DRAPE IOBAN INCISE 23X17" 6650EZ

## (undated) DEVICE — SOL NACL 0.9% INJ 1000ML BAG 2B1324X

## (undated) DEVICE — DRAPE POUCH INSTRUMENT 3 POCKET 1018L

## (undated) DEVICE — SU STRATAFIX PDS PLUS 1 CT-1 18" SXPP1A404

## (undated) DEVICE — SUCTION SLEEVE NEPTUNE 2 165MM 0703-005-165

## (undated) DEVICE — BLADE SAGITTAL WIDE (SO-618) 2108-118

## (undated) DEVICE — DRSG STERI STRIP 1/2X4" R1547

## (undated) DEVICE — SUCTION IRR SYSTEM W/O TIP INTERPULSE HANDPIECE 0210-100-000

## (undated) DEVICE — A3 SUPPLIES- SEE NURSING INFO PAGE

## (undated) RX ORDER — LIDOCAINE HYDROCHLORIDE 10 MG/ML
INJECTION, SOLUTION EPIDURAL; INFILTRATION; INTRACAUDAL; PERINEURAL
Status: DISPENSED
Start: 2022-09-26

## (undated) RX ORDER — PROPOFOL 10 MG/ML
INJECTION, EMULSION INTRAVENOUS
Status: DISPENSED
Start: 2022-09-26

## (undated) RX ORDER — FENTANYL CITRATE 50 UG/ML
INJECTION, SOLUTION INTRAMUSCULAR; INTRAVENOUS
Status: DISPENSED
Start: 2022-09-26

## (undated) RX ORDER — GLYCOPYRROLATE 0.2 MG/ML
INJECTION INTRAMUSCULAR; INTRAVENOUS
Status: DISPENSED
Start: 2022-09-26

## (undated) RX ORDER — FENTANYL CITRATE-0.9 % NACL/PF 10 MCG/ML
PLASTIC BAG, INJECTION (ML) INTRAVENOUS
Status: DISPENSED
Start: 2022-09-26

## (undated) RX ORDER — DEXAMETHASONE SODIUM PHOSPHATE 10 MG/ML
INJECTION, EMULSION INTRAMUSCULAR; INTRAVENOUS
Status: DISPENSED
Start: 2022-09-26

## (undated) RX ORDER — ONDANSETRON 2 MG/ML
INJECTION INTRAMUSCULAR; INTRAVENOUS
Status: DISPENSED
Start: 2022-09-26